# Patient Record
Sex: FEMALE | Race: WHITE | NOT HISPANIC OR LATINO | Employment: FULL TIME | URBAN - METROPOLITAN AREA
[De-identification: names, ages, dates, MRNs, and addresses within clinical notes are randomized per-mention and may not be internally consistent; named-entity substitution may affect disease eponyms.]

---

## 2023-04-28 ENCOUNTER — APPOINTMENT (OUTPATIENT)
Dept: PRIMARY CARE | Facility: CLINIC | Age: 53
End: 2023-04-28
Payer: COMMERCIAL

## 2023-05-08 ENCOUNTER — APPOINTMENT (OUTPATIENT)
Dept: PRIMARY CARE | Facility: CLINIC | Age: 53
End: 2023-05-08
Payer: COMMERCIAL

## 2023-06-12 ENCOUNTER — OFFICE VISIT (OUTPATIENT)
Dept: PRIMARY CARE | Facility: CLINIC | Age: 53
End: 2023-06-12
Payer: COMMERCIAL

## 2023-06-12 VITALS
HEART RATE: 77 BPM | SYSTOLIC BLOOD PRESSURE: 132 MMHG | WEIGHT: 293 LBS | TEMPERATURE: 97.3 F | BODY MASS INDEX: 48.03 KG/M2 | DIASTOLIC BLOOD PRESSURE: 72 MMHG

## 2023-06-12 DIAGNOSIS — Z12.4 CERVICAL CANCER SCREENING: ICD-10-CM

## 2023-06-12 DIAGNOSIS — E66.01 MORBID OBESITY WITH BMI OF 45.0-49.9, ADULT (MULTI): ICD-10-CM

## 2023-06-12 DIAGNOSIS — N93.9 ABNORMAL VAGINAL BLEEDING: ICD-10-CM

## 2023-06-12 DIAGNOSIS — Z00.00 VISIT FOR PREVENTIVE HEALTH EXAMINATION: Primary | ICD-10-CM

## 2023-06-12 DIAGNOSIS — Z12.31 ENCOUNTER FOR SCREENING MAMMOGRAM FOR BREAST CANCER: ICD-10-CM

## 2023-06-12 DIAGNOSIS — R25.2 LEG CRAMPS: ICD-10-CM

## 2023-06-12 PROCEDURE — 3008F BODY MASS INDEX DOCD: CPT | Performed by: FAMILY MEDICINE

## 2023-06-12 PROCEDURE — 99213 OFFICE O/P EST LOW 20 MIN: CPT | Performed by: FAMILY MEDICINE

## 2023-06-12 PROCEDURE — 3078F DIAST BP <80 MM HG: CPT | Performed by: FAMILY MEDICINE

## 2023-06-12 PROCEDURE — 3075F SYST BP GE 130 - 139MM HG: CPT | Performed by: FAMILY MEDICINE

## 2023-06-12 PROCEDURE — 99396 PREV VISIT EST AGE 40-64: CPT | Performed by: FAMILY MEDICINE

## 2023-06-12 PROCEDURE — 87624 HPV HI-RISK TYP POOLED RSLT: CPT

## 2023-06-12 PROCEDURE — 88175 CYTOPATH C/V AUTO FLUID REDO: CPT

## 2023-06-12 PROCEDURE — 1036F TOBACCO NON-USER: CPT | Performed by: FAMILY MEDICINE

## 2023-06-12 RX ORDER — ATORVASTATIN CALCIUM 20 MG/1
20 TABLET, FILM COATED ORAL DAILY
COMMUNITY
Start: 2022-02-21 | End: 2024-01-12

## 2023-06-12 RX ORDER — METOPROLOL SUCCINATE 25 MG/1
TABLET, EXTENDED RELEASE ORAL
COMMUNITY
Start: 2022-04-06 | End: 2023-10-14

## 2023-06-12 RX ORDER — LISINOPRIL 5 MG/1
5 TABLET ORAL DAILY
COMMUNITY
Start: 2022-04-06 | End: 2023-12-05 | Stop reason: WASHOUT

## 2023-06-12 RX ORDER — METHOCARBAMOL 500 MG/1
500 TABLET, FILM COATED ORAL 2 TIMES DAILY
Qty: 20 TABLET | Refills: 2 | Status: SHIPPED | OUTPATIENT
Start: 2023-06-12 | End: 2024-03-25

## 2023-06-12 ASSESSMENT — PATIENT HEALTH QUESTIONNAIRE - PHQ9
1. LITTLE INTEREST OR PLEASURE IN DOING THINGS: NOT AT ALL
SUM OF ALL RESPONSES TO PHQ9 QUESTIONS 1 AND 2: 0
2. FEELING DOWN, DEPRESSED OR HOPELESS: NOT AT ALL

## 2023-06-12 NOTE — PROGRESS NOTES
Subjective   Patient ID: Ana Martel is a 53 y.o. female who presents for Annual Exam (Patient would like a pap.having crams on the calf since Friday. Going throw menopause and having heavy periods when it comes.).  Very pleasant 53-year-old G1, P1 postmenopausal here today for annual wellness exam  No recent hospital ER visits or surgery did have COVID and recovered  No new family history  Went for a hike some weeks ago and has been having significant leg cramps ever since no shortness of breath did have history of PE in the past but no underlying clotting issues  Also has been having some vaginal bleeding postmenopausal which has been concerning intermittent and increasing    Muscle Pain  This is a new problem. The current episode started 1 to 4 weeks ago. The problem occurs daily. The problem has been gradually worsening since onset. The context of the pain is unknown. The pain is present in the left lower leg and right lower leg. The pain is mild. The symptoms are aggravated by any movement. Pertinent negatives include no abdominal pain, chest pain, constipation, diarrhea, dysuria, eye pain, fatigue, fever, headaches, joint swelling, nausea, rash, stiffness, sensory change, shortness of breath, swollen glands, urinary symptoms, vaginal discharge, visual change, vomiting, weakness or wheezing. Past treatments include nothing. The treatment provided no relief. There is no swelling present. She has been Behaving normally. There is no history of chronic back pain, rheumatic disease or sickle cell disease.   Vaginal Bleeding  The patient's primary symptoms include vaginal bleeding. The patient's pertinent negatives include no genital itching, genital lesions, genital odor, genital rash, missed menses, pelvic pain or vaginal discharge. This is a recurrent problem. The current episode started more than 1 month ago. The problem occurs intermittently. The problem has been gradually worsening. The patient is  experiencing no pain. The problem affects both sides. She is not pregnant. Pertinent negatives include no abdominal pain, anorexia, back pain, chills, constipation, diarrhea, discolored urine, dysuria, fever, flank pain, frequency, headaches, hematuria, joint pain, joint swelling, nausea, painful intercourse, rash, sore throat, urgency or vomiting. The vaginal discharge was bloody. The vaginal bleeding is heavier than menses. She has not been passing clots. She has not been passing tissue. Nothing aggravates the symptoms. She has tried nothing for the symptoms. The treatment provided no relief. She is sexually active. No, her partner does not have an STD. She uses nothing for contraception. She is postmenopausal. There is no history of an abdominal surgery, a  section, an ectopic pregnancy, endometriosis, a gynecological surgery, herpes simplex, menorrhagia, metrorrhagia, miscarriage, ovarian cysts, perineal abscess, PID, an STD, a terminated pregnancy or vaginosis.       Review of Systems   Constitutional:  Negative for chills, fatigue and fever.   HENT:  Negative for sore throat.    Eyes:  Negative for pain.   Respiratory:  Negative for shortness of breath and wheezing.    Cardiovascular:  Negative for chest pain.   Gastrointestinal:  Negative for abdominal pain, anorexia, constipation, diarrhea, nausea and vomiting.   Genitourinary:  Positive for vaginal bleeding. Negative for dysuria, flank pain, frequency, hematuria, menorrhagia, missed menses, pelvic pain, urgency and vaginal discharge.   Musculoskeletal:  Negative for back pain, joint pain, joint swelling and stiffness.   Skin:  Negative for rash.   Neurological:  Negative for sensory change, weakness and headaches.     Constitutional: no chills, no fever and no night sweats.   Eyes: no blurred vision and no eyesight problems.   ENT: no hearing loss, no nasal congestion, no nasal discharge, no hoarseness and no sore throat.   Cardiovascular: no chest  pain, no intermittent leg claudication, no lower extremity edema, no palpitations and no syncope.   Respiratory: no cough, no shortness of breath during exertion, no shortness of breath at rest and no wheezing.   Gastrointestinal: no abdominal pain, no blood in stools, no constipation, no diarrhea, no melena, no nausea, no rectal pain and no vomiting.   Genitourinary: no dysuria, no change in urinary frequency, no urinary hesitancy, no feelings of urinary urgency and no vaginal discharge.   Musculoskeletal: no arthralgias,  no back pain and no myalgias.   Integumentary: no new skin lesions and no rashes.   Neurological: no difficulty walking, no headache, no limb weakness, no numbness and no tingling.   Psychiatric: no anxiety, no depression, no anhedonia and no substance use disorders.   Endocrine: no recent weight gain and no recent weight loss.   Hematologic/Lymphatic: no tendency for easy bruising and no swollen glands .    Objective    /72   Pulse 77   Temp 36.3 °C (97.3 °F)   Wt 150 kg (330 lb)   BMI 48.03 kg/m²    Physical Exam  The patient appeared well nourished and normally developed. Vital signs as documented. Head exam is unremarkable. No scleral icterus or corneal arcus noted.  Pupils are equal round reactive to light extraocular movements are intact no hemorrhages noted on funduscopic exam mouth mucous membranes are moist no exudates ears canals clear TMs are gray pearly not injected nose no rhinorrhea or epistaxis Neck is without jugular venous distension, thyromegaly, or carotid bruits. Carotid upstrokes are brisk bilaterally. Lungs are clear to auscultation and percussion. Cardiac exam reveals the PMI to be normally sized and situated. Rhythm is regular. First and second heart sounds normal. No murmurs, rubs or gallops. Abdominal exam reveals normal bowel sounds, no masses, no organomegaly and no aortic enlargement. Extremities are nonedematous and both femoral and pedal pulses are normal.   Neurologic exam DTRs are equal bilaterally no focal deficits strength is symmetrical heme lymph no palpable lymph nodes in the neck axilla or groin breast no masses discharge axillary masses or asymmetry external genitalia unremarkable no adnexal tenderness or masses no cervicitis or cervical motion tenderness or uterine enlargement    Assessment/Plan   Problem List Items Addressed This Visit       Visit for preventive health examination - Primary     Continue annual exams         Relevant Orders    Comprehensive Metabolic Panel    Lipid Panel    Hemoglobin A1C    Leg cramps     Try muscle relaxer  Follow-up if symptoms do not improve  I am checking blood work today to see if there are any electrolyte abnormalities that could explain your cramping         Abnormal vaginal bleeding     Pelvic ultrasound to evaluate the abnormal bleeding  Need to rule out a neoplastic process         Relevant Orders    US pelvis    Morbid obesity with BMI of 45.0-49.9, adult (CMS/HCC)     Above normal BMI  Nutrition and physical activity reviewed          Other Visit Diagnoses       Encounter for screening mammogram for breast cancer        Relevant Orders    BI mammo bilateral screening tomosynthesis    Cervical cancer screening        Relevant Orders    THINPREP PAP TEST (Completed)                 Sully Hill MD

## 2023-06-21 LAB
COMPLETE PATHOLOGY REPORT: NORMAL
CONVERTED CLINICAL DIAGNOSIS-HISTORY: NORMAL
CONVERTED DIAGNOSIS COMMENT: NORMAL
CONVERTED FINAL DIAGNOSIS: NORMAL
CONVERTED FINAL REPORT PDF LINK TO COPY AND PASTE: NORMAL

## 2023-06-25 PROBLEM — J30.2 SEASONAL ALLERGIES: Status: ACTIVE | Noted: 2023-06-25

## 2023-06-25 PROBLEM — I10 HYPERTENSION: Status: ACTIVE | Noted: 2023-06-25

## 2023-06-25 PROBLEM — E78.5 HYPERLIPIDEMIA: Status: ACTIVE | Noted: 2023-06-25

## 2023-06-25 PROBLEM — H04.123 DRY EYES: Status: RESOLVED | Noted: 2023-06-25 | Resolved: 2023-06-25

## 2023-06-25 PROBLEM — N20.0 RENAL LITHIASIS: Status: RESOLVED | Noted: 2023-06-25 | Resolved: 2023-06-25

## 2023-06-25 PROBLEM — K62.5 BRIGHT RED BLOOD PER RECTUM: Status: RESOLVED | Noted: 2023-06-25 | Resolved: 2023-06-25

## 2023-06-25 PROBLEM — E66.01 MORBID OBESITY WITH BMI OF 45.0-49.9, ADULT (MULTI): Status: ACTIVE | Noted: 2023-06-25

## 2023-06-25 PROBLEM — Z00.00 VISIT FOR PREVENTIVE HEALTH EXAMINATION: Status: ACTIVE | Noted: 2023-06-25

## 2023-06-25 PROBLEM — N92.0 HEAVY MENSES: Status: RESOLVED | Noted: 2023-06-25 | Resolved: 2023-06-25

## 2023-06-25 PROBLEM — E03.9 HYPOTHYROIDISM: Status: ACTIVE | Noted: 2023-06-25

## 2023-06-25 PROBLEM — N64.89 BREAST ASYMMETRY: Status: ACTIVE | Noted: 2023-06-25

## 2023-06-25 PROBLEM — U07.1 COVID-19: Status: RESOLVED | Noted: 2023-06-25 | Resolved: 2023-06-25

## 2023-06-25 PROBLEM — R25.2 LEG CRAMPS: Status: ACTIVE | Noted: 2023-06-25

## 2023-06-25 PROBLEM — I26.99 PULMONARY EMBOLISM (MULTI): Status: ACTIVE | Noted: 2023-06-25

## 2023-06-25 PROBLEM — N93.9 ABNORMAL VAGINAL BLEEDING: Status: ACTIVE | Noted: 2023-06-25

## 2023-06-25 ASSESSMENT — ENCOUNTER SYMPTOMS
BACK PAIN: 0
FREQUENCY: 0
SORE THROAT: 0
ABDOMINAL PAIN: 0
WEAKNESS: 0
HEMATURIA: 0
CONSTIPATION: 0
VOMITING: 0
FATIGUE: 0
CHILLS: 0
FLANK PAIN: 0
STIFFNESS: 0
HEADACHES: 0
JOINT SWELLING: 0
SWOLLEN GLANDS: 0
FEVER: 0
NAUSEA: 0
EYE PAIN: 0
SHORTNESS OF BREATH: 0
ANOREXIA: 0
DYSURIA: 0
MUSCULOSKELETAL PAIN: 1
WHEEZING: 0
VISUAL CHANGE: 0
SENSORY CHANGE: 0
DIARRHEA: 0

## 2023-06-26 NOTE — PATIENT INSTRUCTIONS
Today had your annual wellness exam  Blood work for biometric screening  You have been having some abnormal uterine bleeding I am ordering a pelvic ultrasound to evaluate  Leg cramps your examination is normal but I am prescribing a muscle relaxer I think this is most likely muscle strain  I will check electrolytes to make sure there is not something else causing  Remember healthy diet and exercise  Continue annual exams

## 2023-06-26 NOTE — ASSESSMENT & PLAN NOTE
Try muscle relaxer  Follow-up if symptoms do not improve  I am checking blood work today to see if there are any electrolyte abnormalities that could explain your cramping

## 2023-07-01 ENCOUNTER — APPOINTMENT (OUTPATIENT)
Dept: LAB | Facility: LAB | Age: 53
End: 2023-07-01
Payer: COMMERCIAL

## 2023-07-01 LAB
ALANINE AMINOTRANSFERASE (SGPT) (U/L) IN SER/PLAS: 33 U/L (ref 7–45)
ALBUMIN (G/DL) IN SER/PLAS: 4.2 G/DL (ref 3.4–5)
ALKALINE PHOSPHATASE (U/L) IN SER/PLAS: 55 U/L (ref 33–110)
ANION GAP IN SER/PLAS: 13 MMOL/L (ref 10–20)
ASPARTATE AMINOTRANSFERASE (SGOT) (U/L) IN SER/PLAS: 37 U/L (ref 9–39)
BILIRUBIN TOTAL (MG/DL) IN SER/PLAS: 0.5 MG/DL (ref 0–1.2)
CALCIUM (MG/DL) IN SER/PLAS: 9.9 MG/DL (ref 8.6–10.6)
CARBON DIOXIDE, TOTAL (MMOL/L) IN SER/PLAS: 30 MMOL/L (ref 21–32)
CHLORIDE (MMOL/L) IN SER/PLAS: 102 MMOL/L (ref 98–107)
CHOLESTEROL (MG/DL) IN SER/PLAS: 141 MG/DL (ref 0–199)
CHOLESTEROL IN HDL (MG/DL) IN SER/PLAS: 38.1 MG/DL
CHOLESTEROL/HDL RATIO: 3.7
CREATININE (MG/DL) IN SER/PLAS: 0.55 MG/DL (ref 0.5–1.05)
ESTIMATED AVERAGE GLUCOSE FOR HBA1C: 249 MG/DL
GFR FEMALE: >90 ML/MIN/1.73M2
GLUCOSE (MG/DL) IN SER/PLAS: 208 MG/DL (ref 74–99)
HEMOGLOBIN A1C/HEMOGLOBIN TOTAL IN BLOOD: 10.3 %
LDL: 71 MG/DL (ref 0–99)
POTASSIUM (MMOL/L) IN SER/PLAS: 4.5 MMOL/L (ref 3.5–5.3)
PROTEIN TOTAL: 7.4 G/DL (ref 6.4–8.2)
SODIUM (MMOL/L) IN SER/PLAS: 140 MMOL/L (ref 136–145)
TRIGLYCERIDE (MG/DL) IN SER/PLAS: 162 MG/DL (ref 0–149)
UREA NITROGEN (MG/DL) IN SER/PLAS: 14 MG/DL (ref 6–23)
VLDL: 32 MG/DL (ref 0–40)

## 2023-09-20 ENCOUNTER — OFFICE VISIT (OUTPATIENT)
Dept: PRIMARY CARE | Facility: CLINIC | Age: 53
End: 2023-09-20
Payer: COMMERCIAL

## 2023-09-20 VITALS
SYSTOLIC BLOOD PRESSURE: 133 MMHG | HEIGHT: 70 IN | HEART RATE: 82 BPM | WEIGHT: 293 LBS | DIASTOLIC BLOOD PRESSURE: 81 MMHG | BODY MASS INDEX: 41.95 KG/M2 | TEMPERATURE: 96.9 F

## 2023-09-20 DIAGNOSIS — B00.1 COLD SORE: ICD-10-CM

## 2023-09-20 DIAGNOSIS — E11.9 TYPE 2 DIABETES MELLITUS WITHOUT COMPLICATION, UNSPECIFIED WHETHER LONG TERM INSULIN USE (MULTI): Primary | ICD-10-CM

## 2023-09-20 DIAGNOSIS — E66.01 MORBID OBESITY WITH BMI OF 45.0-49.9, ADULT (MULTI): ICD-10-CM

## 2023-09-20 PROCEDURE — 3079F DIAST BP 80-89 MM HG: CPT | Performed by: FAMILY MEDICINE

## 2023-09-20 PROCEDURE — 4010F ACE/ARB THERAPY RXD/TAKEN: CPT | Performed by: FAMILY MEDICINE

## 2023-09-20 PROCEDURE — 3046F HEMOGLOBIN A1C LEVEL >9.0%: CPT | Performed by: FAMILY MEDICINE

## 2023-09-20 PROCEDURE — 3075F SYST BP GE 130 - 139MM HG: CPT | Performed by: FAMILY MEDICINE

## 2023-09-20 PROCEDURE — 3008F BODY MASS INDEX DOCD: CPT | Performed by: FAMILY MEDICINE

## 2023-09-20 PROCEDURE — 99213 OFFICE O/P EST LOW 20 MIN: CPT | Performed by: FAMILY MEDICINE

## 2023-09-20 PROCEDURE — 1036F TOBACCO NON-USER: CPT | Performed by: FAMILY MEDICINE

## 2023-09-20 RX ORDER — VALACYCLOVIR HYDROCHLORIDE 1 G/1
1000 TABLET, FILM COATED ORAL 3 TIMES DAILY
Qty: 21 TABLET | Refills: 0 | Status: SHIPPED | OUTPATIENT
Start: 2023-09-20 | End: 2023-09-27

## 2023-09-20 RX ORDER — TIRZEPATIDE 5 MG/.5ML
5 INJECTION, SOLUTION SUBCUTANEOUS
Qty: 2 ML | Refills: 3 | Status: SHIPPED | OUTPATIENT
Start: 2023-09-20 | End: 2024-03-25 | Stop reason: WASHOUT

## 2023-09-20 RX ORDER — PENCICLOVIR 10 MG/G
CREAM TOPICAL
Qty: 5 G | Refills: 0 | Status: SHIPPED | OUTPATIENT
Start: 2023-09-20 | End: 2023-12-05 | Stop reason: WASHOUT

## 2023-09-20 RX ORDER — METFORMIN HYDROCHLORIDE 1000 MG/1
1000 TABLET ORAL
Qty: 180 TABLET | Refills: 3 | Status: SHIPPED | OUTPATIENT
Start: 2023-09-20 | End: 2024-09-19

## 2023-09-20 ASSESSMENT — PATIENT HEALTH QUESTIONNAIRE - PHQ9
2. FEELING DOWN, DEPRESSED OR HOPELESS: NOT AT ALL
SUM OF ALL RESPONSES TO PHQ9 QUESTIONS 1 AND 2: 0
1. LITTLE INTEREST OR PLEASURE IN DOING THINGS: NOT AT ALL

## 2023-09-20 NOTE — PROGRESS NOTES
Subjective   Patient ID: Ana Martel is a 53 y.o. female who presents for Follow-up (On blood work results for diabetic.).  Very pleasant 53-year-old with family history of diabetes  On screening blood test her A1c was found to be elevated at 10  Patient had prediabetes has struggled with obesity  She has not had any recent hospital or emergency room visits  No chest pain or shortness of breath she is here today for follow-up she has been completely asymptomatic feels a little tired sometimes she is a little thirsty but otherwise has been feeling well has not had any unexplained weight loss  She is here today to discuss the results of her blood work and to initiate treatment she would like to try medical management first        Review of Systems  Constitutional: no chills, no fever and no night sweats.   Eyes: no blurred vision and no eyesight problems.   ENT: no hearing loss, no nasal congestion, no nasal discharge, no hoarseness and no sore throat.   Cardiovascular: no chest pain, no intermittent leg claudication, no lower extremity edema, no palpitations and no syncope.   Respiratory: no cough, no shortness of breath during exertion, no shortness of breath at rest and no wheezing.   Gastrointestinal: no abdominal pain, no blood in stools, no constipation, no diarrhea, no melena, no nausea, no rectal pain and no vomiting.   Genitourinary: no dysuria, no change in urinary frequency, no urinary hesitancy, no feelings of urinary urgency and no vaginal discharge.   Musculoskeletal: no arthralgias,  no back pain and no myalgias.   Integumentary: no new skin lesions and no rashes.   Neurological: no difficulty walking, no headache, no limb weakness, no numbness and no tingling.   Psychiatric: no anxiety, no depression, no anhedonia and no substance use disorders.   Endocrine: no recent weight gain and no recent weight loss.   Hematologic/Lymphatic: no tendency for easy bruising and no swollen glands .    Objective   "  /81   Pulse 82   Temp 36.1 °C (96.9 °F)   Ht 1.778 m (5' 10\")   Wt 145 kg (320 lb)   BMI 45.92 kg/m²    Physical Exam  The patient appeared well nourished and normally developed. Vital signs as documented. Head exam is unremarkable. No scleral icterus or corneal arcus noted.  Pupils are equal round reactive to light extraocular movements are intact no hemorrhages noted on funduscopic exam mouth mucous membranes are moist no exudates ears canals clear TMs are gray pearly not injected nose no rhinorrhea or epistaxis Neck is without jugular venous distension, thyromegaly, or carotid bruits. Carotid upstrokes are brisk bilaterally. Lungs are clear to auscultation and percussion. Cardiac exam reveals the PMI to be normally sized and situated. Rhythm is regular. First and second heart sounds normal. No murmurs, rubs or gallops. Abdominal exam reveals normal bowel sounds, no masses, no organomegaly and no aortic enlargement. Extremities are nonedematous and both femoral and pedal pulses are normal.  Neurologic exam DTRs are equal bilaterally no focal deficits strength is symmetrical heme lymph no palpable lymph nodes in the neck axilla or groin  Barefoot exam good pulses good light touch good capillary refill no ulcers  Assessment/Plan   Problem List Items Addressed This Visit       Morbid obesity with BMI of 45.0-49.9, adult (CMS/[facility])     Above normal BMI nutrition and physical activity reviewed         Type 2 diabetes mellitus without complication (CMS/[facility]) - Primary     Diabetes education  Begin treatment with Mounjaro and Glucophage  Recheck blood work in 3 months           Relevant Medications    tirzepatide (Mounjaro) 5 mg/0.5 mL pen injector    metFORMIN (Glucophage) 1,000 mg tablet    Cold sore    Relevant Medications    penciclovir (Denavir) 1 % cream        New onset diabetes  Begin Mounjaro  Begin Glucophage  Diabetes education class  Follow-up in 3 months  Above normal BMI nutrition and physical " activity reviewed  Cold sore apply topical Denavir    Sully Hill MD

## 2023-09-27 ENCOUNTER — TELEPHONE (OUTPATIENT)
Dept: PRIMARY CARE | Facility: CLINIC | Age: 53
End: 2023-09-27
Payer: COMMERCIAL

## 2023-09-27 NOTE — TELEPHONE ENCOUNTER
Blank form for the doctor to fill out was faxed 9/27/23 by [facility] pharmacies.

## 2023-09-30 PROBLEM — B00.1 COLD SORE: Status: ACTIVE | Noted: 2023-09-30

## 2023-09-30 PROBLEM — E11.9 TYPE 2 DIABETES MELLITUS WITHOUT COMPLICATION (MULTI): Status: ACTIVE | Noted: 2023-09-30

## 2023-09-30 NOTE — ASSESSMENT & PLAN NOTE
Diabetes education  Begin treatment with Mounjaro and Glucophage  Recheck blood work in 3 months

## 2023-10-04 ENCOUNTER — TELEPHONE (OUTPATIENT)
Dept: PRIMARY CARE | Facility: CLINIC | Age: 53
End: 2023-10-04
Payer: COMMERCIAL

## 2023-10-04 NOTE — TELEPHONE ENCOUNTER
Patient received two free samples of Dexcom from Dr. Hill until her insurance cleared and one patch fell off and will not stick.  She was wondering if Dr. Hill had any more samples.

## 2023-10-06 ENCOUNTER — TELEPHONE (OUTPATIENT)
Dept: PRIMARY CARE | Facility: CLINIC | Age: 53
End: 2023-10-06
Payer: COMMERCIAL

## 2023-10-06 NOTE — TELEPHONE ENCOUNTER
Pharmacy is calling to get the status of a prior authorization for dexcon their phone number is [phone].

## 2023-10-09 DIAGNOSIS — I10 PRIMARY HYPERTENSION: Primary | ICD-10-CM

## 2023-10-14 RX ORDER — METOPROLOL SUCCINATE 25 MG/1
25 TABLET, EXTENDED RELEASE ORAL DAILY
Qty: 90 TABLET | Refills: 3 | Status: SHIPPED | OUTPATIENT
Start: 2023-10-14

## 2023-10-19 DIAGNOSIS — E11.9 TYPE 2 DIABETES MELLITUS WITHOUT COMPLICATION, WITHOUT LONG-TERM CURRENT USE OF INSULIN (MULTI): Primary | ICD-10-CM

## 2023-10-23 DIAGNOSIS — I10 HYPERTENSION, UNSPECIFIED TYPE: ICD-10-CM

## 2023-10-23 RX ORDER — TRIAMTERENE/HYDROCHLOROTHIAZID 37.5-25 MG
TABLET ORAL
Qty: 90 TABLET | Refills: 3 | Status: SHIPPED | OUTPATIENT
Start: 2023-10-23

## 2023-10-23 RX ORDER — BLOOD-GLUCOSE SENSOR
1 EACH MISCELLANEOUS DAILY
Qty: 10 EACH | Refills: 11 | Status: SHIPPED | OUTPATIENT
Start: 2023-10-23

## 2023-10-31 RX ORDER — HYDROCODONE BITARTRATE AND HOMATROPINE METHYLBROMIDE ORAL SOLUTION 5; 1.5 MG/5ML; MG/5ML
LIQUID ORAL
COMMUNITY
End: 2024-03-25 | Stop reason: ALTCHOICE

## 2023-10-31 RX ORDER — BENZONATATE 200 MG/1
1 CAPSULE ORAL 3 TIMES DAILY PRN
COMMUNITY
End: 2023-12-05 | Stop reason: WASHOUT

## 2023-10-31 RX ORDER — VALSARTAN 160 MG/1
160 TABLET ORAL DAILY
COMMUNITY
Start: 2023-10-18 | End: 2023-12-14

## 2023-10-31 RX ORDER — ALBUTEROL SULFATE 90 UG/1
AEROSOL, METERED RESPIRATORY (INHALATION)
COMMUNITY
End: 2023-12-05 | Stop reason: WASHOUT

## 2023-10-31 RX ORDER — BLOOD-GLUCOSE,RECEIVER,CONT
EACH MISCELLANEOUS
COMMUNITY
Start: 2023-10-20 | End: 2024-05-22 | Stop reason: WASHOUT

## 2023-11-01 ENCOUNTER — NUTRITION (OUTPATIENT)
Dept: NUTRITION | Facility: CLINIC | Age: 53
End: 2023-11-01
Payer: COMMERCIAL

## 2023-11-01 DIAGNOSIS — E11.9 TYPE 2 DIABETES MELLITUS WITHOUT COMPLICATION, UNSPECIFIED WHETHER LONG TERM INSULIN USE (MULTI): ICD-10-CM

## 2023-11-01 PROCEDURE — 98960 EDU&TRN PT SELF-MGMT NQHP 1: CPT | Performed by: INTERNAL MEDICINE

## 2023-11-01 NOTE — PROGRESS NOTES
Reason for Visit:  Ana Martel is a 53 y.o. female who presents for Initial DSME Visit    DSME - Global Assessment    Marital Status: .  Support Person:   .    What do you hope to gain from this diabetes education visit? A little more knowledge about DM. Since wearing Dexcom is helping me see my numbrers, I'm losing wt eating less processed foods.     Have you had diabetes education in the past?  No.  In Your words, what is Diabetes: Too much sugar and glucose in your body. More work for your pancreas  What Concerns you most about having diabetes: Don't want to need to use insulin.     Readiness to Learn: demonstrates willingness to learn and demonstrates ability to learn  Preferred learning method: reading    Household Composition: living independently, with family    Demographics:   Difficulties with: None  Highest Level of Education: High School  Race/Ethnic Origin: White/  Occupation:    Work hours: 8-5    Health Status:  Smoking/Tobacco Use: No, patient does not smoke or use tobacco.  Alcohol Use: Amount: Once a month one 1-2 drinks.    Type of Diabetes: Type 2  What year were you diagnosed with diabetes: Recent  Family History: Mother  Patient Active Problem List    Diagnosis Date Noted    Type 2 diabetes mellitus without complication (CMS/[facility]) 09/30/2023    Cold sore 09/30/2023    Seasonal allergies 06/25/2023    Pulmonary embolism (CMS/[facility]) 06/25/2023    Hypothyroidism 06/25/2023    Hypertension 06/25/2023    Hyperlipidemia 06/25/2023    Breast asymmetry 06/25/2023    Visit for preventive health examination 06/25/2023    Leg cramps 06/25/2023    Abnormal vaginal bleeding 06/25/2023    Morbid obesity with BMI of 45.0-49.9, adult (CMS/[facility]) 06/25/2023            Lab Results   Component Value Date    HGBA1C 10.3 (A) 07/01/2023    HGBA1C 6.4 09/30/2020       Health Utilization Past 12 Months:   Hospital Admissions: No.  ER Visits: No.  Primary Care Visits: Yes.  Last  Eye Exam : within one year  Podiatry : [facility]  Dentist : once per year    Diabetes Self-Management Skills and Behaviors:   Do you exercise regularly?: Yes. Once per week  minutes per day  Physical Activity : walking  No. Average amount of hours slept per night: 5-6  How do you manage your diabetes when you are sick?: unsure    Diabetes Medications: oral agents and non-insulin injectables  Current Outpatient Medications   Medication Sig Dispense Refill    albuterol 90 mcg/actuation inhaler INHALE 2 PUFFS EVERY 4-6 HOURS BY INHALATION ROUTE AS NEEDED.      atorvastatin (Lipitor) 20 mg tablet Take 1 tablet (20 mg) by mouth once daily.      azithromycin (Zithromax) 250 mg tablet TAKE 2 TABLETS BY MOUTH TODAY, THEN TAKE 1 TABLET DAILY FOR 4 DAYS 6 tablet 0    benzonatate (Tessalon) 200 mg capsule Take 1 capsule (200 mg) by mouth 3 times a day as needed.      blood-glucose sensor (Dexcom G7 Sensor) device Inject 1 each under the skin early in the morning.. 10 each 11    Dexcom G7  misc       hydrocodone-homatropine 5-1.5 mg/5 mL syrup TAKE 5 ML BY MOUTH AT BEDTIME      lisinopril 5 mg tablet Take 1 tablet (5 mg) by mouth once daily.      metFORMIN (Glucophage) 1,000 mg tablet Take 1 tablet (1,000 mg) by mouth 2 times a day with meals. 180 tablet 3    methocarbamol (Robaxin) 500 mg tablet Take 1 tablet (500 mg) by mouth 2 times a day for 10 days. 20 tablet 2    metoprolol succinate XL (Toprol-XL) 25 mg 24 hr tablet Take 1 tablet (25 mg) by mouth once daily. 90 tablet 3    penciclovir (Denavir) 1 % cream Apply topically every 2 hours. 5 g 0    tirzepatide (Mounjaro) 5 mg/0.5 mL pen injector Inject 5 mg under the skin 1 (one) time per week. 2 mL 3    triamterene (DYRENIUM ORAL)       triamterene-hydrochlorothiazid (Maxzide-25) 37.5-25 mg tablet TAKE 1 TABLET DAILY 90 tablet 3    valsartan (Diovan) 160 mg tablet Take 1 tablet (160 mg) by mouth once daily.       No current facility-administered medications for this visit.      Meds  Only taking Metformin once per day because I'm fasting till noon. If I take it at noon and 5 pm my stools are loose and stomach doesn't feel good.       Injection/Infusion Sites: abdominal wall.  No issues    Monitorng: CGM: Dexcom G7    Acute Complications-Safety: None    Hypoglycemia: None  Hypoglycemia Treatment: N/A    Hyperglycemia: None. Had symptoms prior to diagnosis, not now  Hyperglycemia Treatment: None    Reproductive/Currently Pregnant : No.  Do you use birth control? : No  Are you planning to become pregnant in the future? : No  Have you reached Menopause? : Yes    Diabetes Assessment:   DM Interferes with other aspects of my life: neutral.  My level of stress is high: neutral.  I struggle with making changes in my life: disagree.  How do you handle stress: Take a break  Most difficult part of managing DM: Eating. I think I'm a little stressed about food. What is gonna set my numbers high    DSME - Meal Planning and Diet Recall  Are you currently following any meal plan:  Has researched some on how to eat . Does use i2O Water lee to help track carbs.     Does your culture or Latter day require any of the following:  No    How often do you eat out? Once per week (last month prior to diagnosis 3-4 times per week)  How many meals do you eat in per day: three.  Which meals do you tend to skip: breakfast  What do you drink with and between meals: water, green tea.    Diet Recall:   I'm fasting because I normally don't eat in morning anyway. Usually fasting for 17 hours. Eats about 3 times in 7 hour period. Been doing this for about 4-5 months. Has done a lot of food research.    DSME - Goals and Recommendations    [facility] Diabetes Education Program SMART Behavior Goal Setting:        S - Specific: Exactly what do you want to do        M - Measurable: Use a calendar or chart to track progress        A - Attainable: Take small steps to make bigger changes        R - Realistic: Pick  something reasonable that you know you can do        T - Time Oriented: Choose a time limit (No longer than 6 months)    Specific Goal:   It was challenging to get pt to set a measurable goal.  She did want to focus it on diet.  Goal: I will eat about 2 cups non-starchy vegetables between planned lunch and snack afterwards.    Measurable: How will I track my goal:  I will keep track of my progress daily by  Other .   Time Oriented: four weeks.     Topics Covered and Impression:    DSME Topics Covered During Visit:   Understanding Diabetes Basics  Reviewing Medication Classes  Taking Medications as Prescribed  Being Physically Active  Healthy Meal Plan  MyPlate Method    Reviewed Diabetes Technology: Patient accepted Dexcom clarity software invitation.     DSME Topics for Follow-Up:   Reviewed Chronic Complications/Risks Related to Diabetes  Taking Medications as Prescribed  Being Physically Active  Review Glycemic Goals (CGM or SMBG)  Reviewed Hypoglycemia Signs/Symptoms/Tx Plan  Reviewed Hyperglycemia Signs/Symptoms/Tx Plan  Glycemic Pattern Management  Healthy Meal Plan  Sick Day Rules  Understanding CGM data    Materials provided during today's visit:  DM patient guide, 2 handouts on exercise, blood glucose log sheet, A1C handout, hypo/hyperglycemia signs/symptoms & treatment for hypoglycemia. Non-starchy vegetables list.       Provider Impression: Patient is not taking Metformin 1000 mg twice daily (missing morning dose) due to 17 hour fasting. States if she takes it at first meal and last meal (5 hours apart) then she gets stomach upset & loose stools. Suggested she try taking both pills at dinner and see if she notices any difference in her blood sugars. She feels fasting is helpful and wants to continue. She wanted to focus visit mostly on diet. Discussed how to boost fiber, healthy fat and protein at meals, adding resistant starch and if possible eating carbs towards end of meal all in effort to keep blood  "sugars more stable. Began education on basic CHO counting, using exchange lists, reading labels, limiting unhealthy & including healthy fats & protein sources. Reviewed some meals she eats and some healthier options provided. She feels CGM has been helpful to show her how certain foods impact her. \"After I ate something it made my blood sugar jump into 300's.\" She has made changes to reduce those foods and portions. Reports having lost a few pounds. Role and benefit of regular physical activity discussed including aerobic and resistance exercises. Although she recently started wearing Dexcom G7 and did accept this writer's invitation to view data on Dexcom software yesterday we did not have time to review the data. Plan to do this at next visit and cover additional DSMES topics at next visit. The referring provider is within the same EMR and is able to see the DSMES provided and the outcomes.       Time: I personally spent a total of 60 minutes with the patient providing diabetes self-management education. Visit documentation will be sent electronically to referring provider.          "

## 2023-11-13 ENCOUNTER — TELEPHONE (OUTPATIENT)
Dept: PRIMARY CARE | Facility: CLINIC | Age: 53
End: 2023-11-13
Payer: COMMERCIAL

## 2023-11-13 NOTE — TELEPHONE ENCOUNTER
Patient would like to get blood order put in for her December physical so she can get it down before the appointment. She will go down the eisenberg at [facility].  She also got an order for education for diabetes management and is having trouble finding someone to help her.  She would like to get the order changed to nutrition  management.

## 2023-11-14 DIAGNOSIS — E11.9 TYPE 2 DIABETES MELLITUS WITHOUT COMPLICATION, WITHOUT LONG-TERM CURRENT USE OF INSULIN (MULTI): Primary | ICD-10-CM

## 2023-11-21 ENCOUNTER — TELEPHONE (OUTPATIENT)
Dept: PRIMARY CARE | Facility: CLINIC | Age: 53
End: 2023-11-21
Payer: COMMERCIAL

## 2023-11-21 NOTE — TELEPHONE ENCOUNTER
Patient needs her blood orders changed from [facility] orders to  orders. So she can get them done at a  lab.

## 2023-12-01 ENCOUNTER — LAB (OUTPATIENT)
Dept: LAB | Facility: LAB | Age: 53
End: 2023-12-01
Payer: COMMERCIAL

## 2023-12-01 DIAGNOSIS — E11.9 TYPE 2 DIABETES MELLITUS WITHOUT COMPLICATION, WITHOUT LONG-TERM CURRENT USE OF INSULIN (MULTI): ICD-10-CM

## 2023-12-01 LAB
ALBUMIN SERPL BCP-MCNC: 4.6 G/DL (ref 3.4–5)
ALP SERPL-CCNC: 54 U/L (ref 33–110)
ALT SERPL W P-5'-P-CCNC: 24 U/L (ref 7–45)
ANION GAP SERPL CALC-SCNC: 13 MMOL/L (ref 10–20)
AST SERPL W P-5'-P-CCNC: 18 U/L (ref 9–39)
BILIRUB SERPL-MCNC: 0.4 MG/DL (ref 0–1.2)
BUN SERPL-MCNC: 12 MG/DL (ref 6–23)
CALCIUM SERPL-MCNC: 10.1 MG/DL (ref 8.6–10.6)
CHLORIDE SERPL-SCNC: 100 MMOL/L (ref 98–107)
CHOLEST SERPL-MCNC: 126 MG/DL (ref 0–199)
CHOLESTEROL/HDL RATIO: 3.6
CO2 SERPL-SCNC: 31 MMOL/L (ref 21–32)
CREAT SERPL-MCNC: 0.68 MG/DL (ref 0.5–1.05)
EST. AVERAGE GLUCOSE BLD GHB EST-MCNC: 160 MG/DL
GFR SERPL CREATININE-BSD FRML MDRD: >90 ML/MIN/1.73M*2
GLUCOSE SERPL-MCNC: 96 MG/DL (ref 74–99)
HBA1C MFR BLD: 7.2 %
HDLC SERPL-MCNC: 34.9 MG/DL
LDLC SERPL CALC-MCNC: 69 MG/DL
NON HDL CHOLESTEROL: 91 MG/DL (ref 0–149)
POTASSIUM SERPL-SCNC: 4 MMOL/L (ref 3.5–5.3)
PROT SERPL-MCNC: 7.7 G/DL (ref 6.4–8.2)
SODIUM SERPL-SCNC: 140 MMOL/L (ref 136–145)
TRIGL SERPL-MCNC: 112 MG/DL (ref 0–149)
VLDL: 22 MG/DL (ref 0–40)

## 2023-12-01 PROCEDURE — 80053 COMPREHEN METABOLIC PANEL: CPT

## 2023-12-01 PROCEDURE — 80061 LIPID PANEL: CPT

## 2023-12-01 PROCEDURE — 36415 COLL VENOUS BLD VENIPUNCTURE: CPT

## 2023-12-01 PROCEDURE — 83036 HEMOGLOBIN GLYCOSYLATED A1C: CPT

## 2023-12-05 ENCOUNTER — OFFICE VISIT (OUTPATIENT)
Dept: PRIMARY CARE | Facility: CLINIC | Age: 53
End: 2023-12-05
Payer: COMMERCIAL

## 2023-12-05 ENCOUNTER — APPOINTMENT (OUTPATIENT)
Dept: NUTRITION | Facility: CLINIC | Age: 53
End: 2023-12-05
Payer: COMMERCIAL

## 2023-12-05 ENCOUNTER — NUTRITION (OUTPATIENT)
Dept: PRIMARY CARE | Facility: CLINIC | Age: 53
End: 2023-12-05
Payer: COMMERCIAL

## 2023-12-05 VITALS — HEIGHT: 70 IN | WEIGHT: 288 LBS | BODY MASS INDEX: 41.23 KG/M2

## 2023-12-05 VITALS
SYSTOLIC BLOOD PRESSURE: 139 MMHG | DIASTOLIC BLOOD PRESSURE: 85 MMHG | TEMPERATURE: 98.3 F | WEIGHT: 288 LBS | HEIGHT: 70 IN | BODY MASS INDEX: 41.23 KG/M2 | HEART RATE: 72 BPM

## 2023-12-05 DIAGNOSIS — E11.9 TYPE 2 DIABETES MELLITUS WITHOUT COMPLICATION, WITHOUT LONG-TERM CURRENT USE OF INSULIN (MULTI): Primary | ICD-10-CM

## 2023-12-05 PROCEDURE — 99213 OFFICE O/P EST LOW 20 MIN: CPT | Performed by: FAMILY MEDICINE

## 2023-12-05 PROCEDURE — 1036F TOBACCO NON-USER: CPT | Performed by: FAMILY MEDICINE

## 2023-12-05 PROCEDURE — 3051F HG A1C>EQUAL 7.0%<8.0%: CPT | Performed by: FAMILY MEDICINE

## 2023-12-05 PROCEDURE — 3008F BODY MASS INDEX DOCD: CPT | Performed by: FAMILY MEDICINE

## 2023-12-05 PROCEDURE — 3075F SYST BP GE 130 - 139MM HG: CPT | Performed by: FAMILY MEDICINE

## 2023-12-05 PROCEDURE — 3048F LDL-C <100 MG/DL: CPT | Performed by: FAMILY MEDICINE

## 2023-12-05 PROCEDURE — 97802 MEDICAL NUTRITION INDIV IN: CPT | Performed by: DIETITIAN, REGISTERED

## 2023-12-05 PROCEDURE — 4010F ACE/ARB THERAPY RXD/TAKEN: CPT | Performed by: FAMILY MEDICINE

## 2023-12-05 PROCEDURE — 3079F DIAST BP 80-89 MM HG: CPT | Performed by: FAMILY MEDICINE

## 2023-12-05 RX ORDER — TIRZEPATIDE 7.5 MG/.5ML
7.5 INJECTION, SOLUTION SUBCUTANEOUS
Qty: 2 ML | Refills: 11 | Status: SHIPPED | OUTPATIENT
Start: 2023-12-05 | End: 2024-04-02 | Stop reason: SDUPTHER

## 2023-12-05 NOTE — PROGRESS NOTES
"Nutrition Assessment     Reason for Visit:  Ana Martel is a 53 y.o. female who presents for Nutrition Counseling and Diabetes    Anthropometrics:  Anthropometrics  Height: 177.8 cm (5' 10\")  Weight: 131 kg (288 lb)  BMI (Calculated): 41.32  Weight Change  Weight History / % Weight Change: 330# (6/12/23), 320# (9/20/23)  Significant weight change: Yes     Food And Nutrient Intake:  Food and Nutrient History  Food and Nutrient History: Patient is here to discuss diet for diabetes. She has been reading about diet online (such as American Diabetes Association) and has been attentive to the relationship between her food choices and blood glucose readings in her CGM. She has noticed that foods with fat (avocado) or protein (high protien yogurt) cause her to have better glycemic control if they are included at meals.  Fluid Intake: water, green tea, or Liquid IV electrolyte packets  Food Allergy: fish and shellfish  Food Intolerance: beef - feels undigested the next day, acid burps, vomits.  GI Symptoms: early satiety (intentional, is taking Mounjaro)  Oral Problems: denies  Dentition: own  Sleep Duration/Quality:  (bed 8-9, wakes up 5)     Food Intake  Meal 1: B: wakes up at 4:30-5. First meal around 10-11 - yogurt (looks for greatest amount of protein, such as [employer] brand), an egg (prepared in a pan or 1 egg from ), and avocado (mashed w/ lemon from s) toast (Brownberry multigrain sandwich thin). Sometimes w/ pistachio.  Meal 2: L: 1 pm Fairlife or Premier Protien shake. Eats pistachios now if she didn't eat them in the morning (2 ounces)  Meal 3: D: 4-5 pm, a salad prepared with 2 ounces lettuce, 1 ounce tomato, avocado, 4 ounces blackened chicken strips, onion, black olives. Or uses yogurt as a dressing w/ taco seasoning.  Snacks: mitzi, blueberries, or celery    Food Preparation  Cooking: Patient  Grocery Shopping: Patient    Food And Nutrient Administration:  Diet Experience  Previous Diet / Nutrition " Education / Counseling: She has met with Sigifredo Monge for DSMES.  Self-selected diet(s) followed: She has been carefully planning food ahead of time,  measuring food with a food scale, tracking everything she eats with [employer].    Cultural considerations: Her family is of Chinese background, she states that there are foods she loves to eat when she returns home ([place] area) and she has found that she is now satisfied with a fraction of the portion she used to eat. She allows herself to eat these foods. Has noticed if she is physically active/takes a walk after a heavier meal her blood sugar levels are better controlled.     Physical Activities:  Physical Activity  Physical Activity History: used to work out every morning at the gym, got at least 5-7K steps duirng that workout, lifted weights and had a  2 days per week. Likes working out first thing in the morning, feels comfortable working out without an audience.  She plans to start back into a habit of exercise in the next month, anticipates using stairclimber, treadmill.  Type of Physical Activity: takes the dog for a walk every night with her daughter.    Nutrition Focused Physical Exam:  Deferred, no signs of malnutrition present.      Nutrition Diagnosis   Malnutrition Diagnosis  Patient has Malnutrition Diagnosis: No  Nutrition Diagnosis  Patient has Nutrition Diagnosis: Yes  Diagnosis Status (1): New  Nutrition Diagnosis 1: Altered nutrition related to laboratory values  Related to (1): endocrine dysfunction  As Evidenced by (1): A1C 7.2% (had been 10.3% 4 months ago)    Nutrition Interventions/Recommendations   Food and Nutrition Delivery  Meals & Snacks: Carbohydrate-modified diet, Modify Composition of Meals/Snacks  Nutrition Education  Nutrition Education Content: Physical activity guidance    Patient Instructions   Utilize the Plate Method at meals in order to balance the types and amounts of food on the plate.   - half of the plate full of  non-starchy vegetables. These foods contribute very little carbohydrate to the plate, and they add fiber to the meal. Salad, carrots, bell peppers, zucchini, broccoli, cauliflower, asparagus, radishes, carrots and green beans are a few examples of these foods. They can be served cooked or raw.    - one quarter of plate including protein foods. Examples can include meat, nuts, seeds, cheese, cottage cheese, nut butter, fish, seafood, tofu, and edamame.    - one quarter of the plate including carbohydrate foods. These foods include grains, fruit, legumes, starchy vegetables, milk and yogurt. Aim to eat at least half of the daily grains as whole grains.     Introduced patient to carbohydrate counting. Explained the following:   - foods that contribute carbohydrate (fruit, grains, legumes, milk/yogurt, starchy vegetables, sugar added to foods, sugar-sweetened beverages)  - foods that contribute no/minimal carbohydrate (protein, fats, non-starchy vegetables)  - how to use portions of food that are 15 grams of carbohydrate to facilitate counting, gave examples of such portions  - how to read a food label to count carbohydrate. Discussed that total carbohydrate is the number she will count. Explained that carbohydrates that are more complex (starch, resistant starch, high-fiber starchy foods) will break down more slowly and make her blood sugar go up more slowly than foods that have sugar in them. Advised limiting added sugars to 40 grams or less per day, it seems that she is eating much less than this amount which is wonderful.   - advised patient to consume 30-45 grams of carbohydrate per meal (or up to 60 gm at the most especially if physically active) and 15 grams of carbohydrate per snack    3. Briefly talked about the importance of making sure that dietary changes are sustainable. Acknowledged the strides she has made in improving her diet and controlling her blood sugars. Talked about how she handles incorporating  foods that she enjoys, and the strategies she uses to mitigate carbohydrate intake if she plans to enjoy a more indulgent food. She seems to have good plans in place and has a positive outlook about sustaining this diet.       Nutrition Monitoring and Evaluation   Food/Nutrient Related History Monitoring  Monitoring and Evaluation Plan: Amount of food, Meal/snack pattern, Carbohydrate intake  Amount of Food: Estimated amout of food, Types of food  Criteria: She will use the Plate Method to guide the amount/type of foods on her plate  Meal/Snack Pattern: Estimated meal and snack pattern  Criteria: She will continue to eat 3 times per day so that food/carbohydrate intake is spread through the day  Estimated carbohydrate intake: Estimated carbohydrate intake  Criteria: She will aim to eat about 30-45 gm carbohydrate per meal, 3 times per day  Knowledge/Beliefs/Attitudes  Monitoring and Evaluation Plan: Physical activity  Physical activity: Consistency  Criteria: She will follow through with the plan to re-start a regular workout routine within the next month    Handouts: Plate Method, Planning Healthy Meals, also suggested check out [employer].org for whole grain recipes, and diatribe.org for diabetes articles/recipes. Told her about Carbs & Cals lee.      Readiness to Change : Excellent  Level of Understanding : Excellent  Anticipated Compliant : Excellent

## 2023-12-05 NOTE — PROGRESS NOTES
"Subjective   Patient ID: Ana Martel is a 53 y.o. female who presents for Diabetes.  Very pleasant 53-year-old recently diagnosed with type 2 diabetes  A1c was originally 12  She was started on Mounjaro and is doing very well  She is tolerating the medicine well has had no episodes of hypo or hyperglycemia  She has lost some weight  She has been modifying her eating  Her blood sugars on average have gone down significantly and are mostly below 200        Review of Systems  Constitutional: no chills, no fever and no night sweats.   Eyes: no blurred vision and no eyesight problems.   ENT: no hearing loss, no nasal congestion, no nasal discharge, no hoarseness and no sore throat.   Cardiovascular: no chest pain, no intermittent leg claudication, no lower extremity edema, no palpitations and no syncope.   Respiratory: no cough, no shortness of breath during exertion, no shortness of breath at rest and no wheezing.   Gastrointestinal: no abdominal pain, no blood in stools, no constipation, no diarrhea, no melena, no nausea, no rectal pain and no vomiting.   Genitourinary: no dysuria, no change in urinary frequency, no urinary hesitancy, no feelings of urinary urgency and no vaginal discharge.   Musculoskeletal: no arthralgias,  no back pain and no myalgias.   Integumentary: no new skin lesions and no rashes.   Neurological: no difficulty walking, no headache, no limb weakness, no numbness and no tingling.   Psychiatric: no anxiety, no depression, no anhedonia and no substance use disorders.   Endocrine: no recent weight gain and no recent weight loss.   Hematologic/Lymphatic: no tendency for easy bruising and no swollen glands .    Objective    /85   Pulse 72   Temp 36.8 °C (98.3 °F)   Ht 1.778 m (5' 10\")   Wt 131 kg (288 lb)   BMI 41.32 kg/m²    Physical Exam  The patient appeared well nourished and normally developed. Vital signs as documented. Head exam is unremarkable. No scleral icterus or corneal " arcus noted.  Pupils are equal round reactive to light extraocular movements are intact no hemorrhages noted on funduscopic exam mouth mucous membranes are moist no exudates ears canals clear TMs are gray pearly not injected nose no rhinorrhea or epistaxis Neck is without jugular venous distension, thyromegaly, or carotid bruits. Carotid upstrokes are brisk bilaterally. Lungs are clear to auscultation and percussion. Cardiac exam reveals the PMI to be normally sized and situated. Rhythm is regular. First and second heart sounds normal. No murmurs, rubs or gallops. Abdominal exam reveals normal bowel sounds, no masses, no organomegaly and no aortic enlargement. Extremities are nonedematous and both femoral and pedal pulses are normal.  Neurologic exam DTRs are equal bilaterally no focal deficits strength is symmetrical heme lymph no palpable lymph nodes in the neck axilla or groin  Bare foot exam good pulses good light touch good capillary refill  Assessment/Plan   Problem List Items Addressed This Visit       Type 2 diabetes mellitus without complication (CMS/HCC) - Primary     Stable based on symptoms and exam  Increase Mounjaro to 7.5 mg weekly  Recheck blood work and follow-up in 3 months         Relevant Medications    tirzepatide (Mounjaro) 7.5 mg/0.5 mL pen injector            Sully Hill MD

## 2023-12-05 NOTE — PATIENT INSTRUCTIONS
Utilize the Plate Method at meals in order to balance the types and amounts of food on the plate.   - half of the plate full of non-starchy vegetables. These foods contribute very little carbohydrate to the plate, and they add fiber to the meal. Salad, carrots, bell peppers, zucchini, broccoli, cauliflower, asparagus, radishes, carrots and green beans are a few examples of these foods. They can be served cooked or raw.    - one quarter of plate including protein foods. Examples can include meat, nuts, seeds, cheese, cottage cheese, nut butter, fish, seafood, tofu, and edamame.    - one quarter of the plate including carbohydrate foods. These foods include grains, fruit, legumes, starchy vegetables, milk and yogurt. Aim to eat at least half of the daily grains as whole grains.     Introduced patient to carbohydrate counting. Explained the following:   - foods that contribute carbohydrate (fruit, grains, legumes, milk/yogurt, starchy vegetables, sugar added to foods, sugar-sweetened beverages)  - foods that contribute no/minimal carbohydrate (protein, fats, non-starchy vegetables)  - how to use portions of food that are 15 grams of carbohydrate to facilitate counting, gave examples of such portions  - how to read a food label to count carbohydrate. Discussed that total carbohydrate is the number she will count. Explained that carbohydrates that are more complex (starch, resistant starch, high-fiber starchy foods) will break down more slowly and make her blood sugar go up more slowly than foods that have sugar in them. Advised limiting added sugars to 40 grams or less per day, it seems that she is eating much less than this amount which is wonderful.   - advised patient to consume 30-45 grams of carbohydrate per meal (or up to 60 gm at the most especially if physically active) and 15 grams of carbohydrate per snack    3. Briefly talked about the importance of making sure that dietary changes are sustainable.  Acknowledged the strides she has made in improving her diet and controlling her blood sugars. Talked about how she handles incorporating foods that she enjoys, and the strategies she uses to mitigate carbohydrate intake if she plans to enjoy a more indulgent food. She seems to have good plans in place and has a positive outlook about sustaining this diet.

## 2023-12-29 NOTE — ASSESSMENT & PLAN NOTE
Stable based on symptoms and exam  Increase Mounjaro to 7.5 mg weekly  Recheck blood work and follow-up in 3 months

## 2024-01-12 DIAGNOSIS — E78.5 HYPERLIPIDEMIA, UNSPECIFIED HYPERLIPIDEMIA TYPE: ICD-10-CM

## 2024-01-12 RX ORDER — ATORVASTATIN CALCIUM 20 MG/1
20 TABLET, FILM COATED ORAL DAILY
Qty: 90 TABLET | Refills: 3 | Status: SHIPPED | OUTPATIENT
Start: 2024-01-12

## 2024-01-29 ENCOUNTER — APPOINTMENT (OUTPATIENT)
Dept: PRIMARY CARE | Facility: CLINIC | Age: 54
End: 2024-01-29
Payer: COMMERCIAL

## 2024-02-09 ENCOUNTER — HOSPITAL ENCOUNTER (EMERGENCY)
Facility: HOSPITAL | Age: 54
Discharge: HOME | End: 2024-02-09
Payer: COMMERCIAL

## 2024-02-09 ENCOUNTER — APPOINTMENT (OUTPATIENT)
Dept: RADIOLOGY | Facility: HOSPITAL | Age: 54
End: 2024-02-09
Payer: COMMERCIAL

## 2024-02-09 VITALS
SYSTOLIC BLOOD PRESSURE: 128 MMHG | HEIGHT: 70 IN | RESPIRATION RATE: 16 BRPM | DIASTOLIC BLOOD PRESSURE: 70 MMHG | HEART RATE: 71 BPM | BODY MASS INDEX: 38.08 KG/M2 | TEMPERATURE: 97 F | OXYGEN SATURATION: 98 % | WEIGHT: 266 LBS

## 2024-02-09 DIAGNOSIS — D25.9 UTERINE LEIOMYOMA, UNSPECIFIED LOCATION: ICD-10-CM

## 2024-02-09 DIAGNOSIS — K57.92 DIVERTICULITIS: Primary | ICD-10-CM

## 2024-02-09 DIAGNOSIS — R91.1 PULMONARY NODULE: ICD-10-CM

## 2024-02-09 LAB
ALBUMIN SERPL BCP-MCNC: 4.2 G/DL (ref 3.4–5)
ALP SERPL-CCNC: 45 U/L (ref 33–110)
ALT SERPL W P-5'-P-CCNC: 15 U/L (ref 7–45)
ANION GAP SERPL CALC-SCNC: 12 MMOL/L (ref 10–20)
APPEARANCE UR: ABNORMAL
AST SERPL W P-5'-P-CCNC: 15 U/L (ref 9–39)
BACTERIA #/AREA URNS AUTO: ABNORMAL /HPF
BASOPHILS # BLD AUTO: 0.06 X10*3/UL (ref 0–0.1)
BASOPHILS NFR BLD AUTO: 0.5 %
BILIRUB SERPL-MCNC: 0.4 MG/DL (ref 0–1.2)
BILIRUB UR STRIP.AUTO-MCNC: NEGATIVE MG/DL
BUN SERPL-MCNC: 19 MG/DL (ref 6–23)
CALCIUM SERPL-MCNC: 9.5 MG/DL (ref 8.6–10.3)
CHLORIDE SERPL-SCNC: 100 MMOL/L (ref 98–107)
CO2 SERPL-SCNC: 28 MMOL/L (ref 21–32)
COLOR UR: YELLOW
CREAT SERPL-MCNC: 0.75 MG/DL (ref 0.5–1.05)
EGFRCR SERPLBLD CKD-EPI 2021: >90 ML/MIN/1.73M*2
EOSINOPHIL # BLD AUTO: 0.28 X10*3/UL (ref 0–0.7)
EOSINOPHIL NFR BLD AUTO: 2.3 %
ERYTHROCYTE [DISTWIDTH] IN BLOOD BY AUTOMATED COUNT: 18 % (ref 11.5–14.5)
GLUCOSE SERPL-MCNC: 117 MG/DL (ref 74–99)
GLUCOSE UR STRIP.AUTO-MCNC: NEGATIVE MG/DL
HCT VFR BLD AUTO: 42.4 % (ref 36–46)
HGB BLD-MCNC: 13 G/DL (ref 12–16)
IMM GRANULOCYTES # BLD AUTO: 0.04 X10*3/UL (ref 0–0.7)
IMM GRANULOCYTES NFR BLD AUTO: 0.3 % (ref 0–0.9)
KETONES UR STRIP.AUTO-MCNC: NEGATIVE MG/DL
LACTATE SERPL-SCNC: 0.8 MMOL/L (ref 0.4–2)
LEUKOCYTE ESTERASE UR QL STRIP.AUTO: ABNORMAL
LIPASE SERPL-CCNC: 24 U/L (ref 9–82)
LYMPHOCYTES # BLD AUTO: 3.31 X10*3/UL (ref 1.2–4.8)
LYMPHOCYTES NFR BLD AUTO: 26.8 %
MCH RBC QN AUTO: 20.1 PG (ref 26–34)
MCHC RBC AUTO-ENTMCNC: 30.7 G/DL (ref 32–36)
MCV RBC AUTO: 65 FL (ref 80–100)
MONOCYTES # BLD AUTO: 0.82 X10*3/UL (ref 0.1–1)
MONOCYTES NFR BLD AUTO: 6.6 %
MUCOUS THREADS #/AREA URNS AUTO: ABNORMAL /LPF
NEUTROPHILS # BLD AUTO: 7.84 X10*3/UL (ref 1.2–7.7)
NEUTROPHILS NFR BLD AUTO: 63.5 %
NITRITE UR QL STRIP.AUTO: NEGATIVE
NRBC BLD-RTO: 0 /100 WBCS (ref 0–0)
PH UR STRIP.AUTO: 5 [PH]
PLATELET # BLD AUTO: 282 X10*3/UL (ref 150–450)
POTASSIUM SERPL-SCNC: 3.9 MMOL/L (ref 3.5–5.3)
PROT SERPL-MCNC: 7.3 G/DL (ref 6.4–8.2)
PROT UR STRIP.AUTO-MCNC: NEGATIVE MG/DL
RBC # BLD AUTO: 6.48 X10*6/UL (ref 4–5.2)
RBC # UR STRIP.AUTO: ABNORMAL /UL
RBC #/AREA URNS AUTO: ABNORMAL /HPF
SODIUM SERPL-SCNC: 136 MMOL/L (ref 136–145)
SP GR UR STRIP.AUTO: 1.02
SQUAMOUS #/AREA URNS AUTO: ABNORMAL /HPF
UROBILINOGEN UR STRIP.AUTO-MCNC: <2 MG/DL
WBC # BLD AUTO: 12.4 X10*3/UL (ref 4.4–11.3)
WBC #/AREA URNS AUTO: ABNORMAL /HPF

## 2024-02-09 PROCEDURE — 83690 ASSAY OF LIPASE: CPT | Performed by: NURSE PRACTITIONER

## 2024-02-09 PROCEDURE — 2500000001 HC RX 250 WO HCPCS SELF ADMINISTERED DRUGS (ALT 637 FOR MEDICARE OP): Performed by: PHYSICIAN ASSISTANT

## 2024-02-09 PROCEDURE — 81001 URINALYSIS AUTO W/SCOPE: CPT | Performed by: NURSE PRACTITIONER

## 2024-02-09 PROCEDURE — 74177 CT ABD & PELVIS W/CONTRAST: CPT

## 2024-02-09 PROCEDURE — 99284 EMERGENCY DEPT VISIT MOD MDM: CPT | Mod: 25

## 2024-02-09 PROCEDURE — 85025 COMPLETE CBC W/AUTO DIFF WBC: CPT | Performed by: NURSE PRACTITIONER

## 2024-02-09 PROCEDURE — 2550000001 HC RX 255 CONTRASTS: Performed by: NURSE PRACTITIONER

## 2024-02-09 PROCEDURE — 80053 COMPREHEN METABOLIC PANEL: CPT | Performed by: NURSE PRACTITIONER

## 2024-02-09 PROCEDURE — 36415 COLL VENOUS BLD VENIPUNCTURE: CPT | Performed by: NURSE PRACTITIONER

## 2024-02-09 PROCEDURE — 99284 EMERGENCY DEPT VISIT MOD MDM: CPT | Mod: 25,27 | Performed by: PHYSICIAN ASSISTANT

## 2024-02-09 PROCEDURE — 87086 URINE CULTURE/COLONY COUNT: CPT | Mod: PARLAB | Performed by: NURSE PRACTITIONER

## 2024-02-09 PROCEDURE — 83605 ASSAY OF LACTIC ACID: CPT | Performed by: NURSE PRACTITIONER

## 2024-02-09 PROCEDURE — 74177 CT ABD & PELVIS W/CONTRAST: CPT | Performed by: STUDENT IN AN ORGANIZED HEALTH CARE EDUCATION/TRAINING PROGRAM

## 2024-02-09 RX ORDER — AMOXICILLIN AND CLAVULANATE POTASSIUM 875; 125 MG/1; MG/1
1 TABLET, FILM COATED ORAL EVERY 12 HOURS
Qty: 20 TABLET | Refills: 0 | Status: SHIPPED | OUTPATIENT
Start: 2024-02-09 | End: 2024-02-19

## 2024-02-09 RX ORDER — OXYCODONE AND ACETAMINOPHEN 5; 325 MG/1; MG/1
1 TABLET ORAL ONCE
Status: COMPLETED | OUTPATIENT
Start: 2024-02-09 | End: 2024-02-09

## 2024-02-09 RX ORDER — OXYCODONE AND ACETAMINOPHEN 5; 325 MG/1; MG/1
1 TABLET ORAL EVERY 6 HOURS PRN
Qty: 5 TABLET | Refills: 0 | Status: SHIPPED | OUTPATIENT
Start: 2024-02-09 | End: 2024-02-12

## 2024-02-09 RX ORDER — AMOXICILLIN AND CLAVULANATE POTASSIUM 875; 125 MG/1; MG/1
1 TABLET, FILM COATED ORAL ONCE
Status: COMPLETED | OUTPATIENT
Start: 2024-02-09 | End: 2024-02-09

## 2024-02-09 RX ORDER — NAPROXEN 500 MG/1
500 TABLET ORAL
Qty: 30 TABLET | Refills: 0 | Status: SHIPPED | OUTPATIENT
Start: 2024-02-09 | End: 2024-02-24

## 2024-02-09 RX ADMIN — OXYCODONE HYDROCHLORIDE AND ACETAMINOPHEN 1 TABLET: 5; 325 TABLET ORAL at 22:27

## 2024-02-09 RX ADMIN — IOHEXOL 75 ML: 350 INJECTION, SOLUTION INTRAVENOUS at 21:24

## 2024-02-09 RX ADMIN — AMOXICILLIN AND CLAVULANATE POTASSIUM 1 TABLET: 875; 125 TABLET, FILM COATED ORAL at 22:27

## 2024-02-09 ASSESSMENT — PAIN - FUNCTIONAL ASSESSMENT: PAIN_FUNCTIONAL_ASSESSMENT: 0-10

## 2024-02-09 ASSESSMENT — PAIN DESCRIPTION - FREQUENCY: FREQUENCY: INTERMITTENT

## 2024-02-09 ASSESSMENT — PAIN SCALES - GENERAL: PAINLEVEL_OUTOF10: 6

## 2024-02-09 ASSESSMENT — PAIN DESCRIPTION - LOCATION: LOCATION: ABDOMEN

## 2024-02-09 ASSESSMENT — COLUMBIA-SUICIDE SEVERITY RATING SCALE - C-SSRS
2. HAVE YOU ACTUALLY HAD ANY THOUGHTS OF KILLING YOURSELF?: NO
1. IN THE PAST MONTH, HAVE YOU WISHED YOU WERE DEAD OR WISHED YOU COULD GO TO SLEEP AND NOT WAKE UP?: NO
6. HAVE YOU EVER DONE ANYTHING, STARTED TO DO ANYTHING, OR PREPARED TO DO ANYTHING TO END YOUR LIFE?: NO

## 2024-02-09 ASSESSMENT — PAIN DESCRIPTION - PAIN TYPE: TYPE: ACUTE PAIN

## 2024-02-10 LAB — HOLD SPECIMEN: NORMAL

## 2024-02-10 NOTE — ED TRIAGE NOTES
Secondary to patient volumes and overcrowding, I performed a brief medical screening exam of the patient in triage, as the patient awaits space in the main ED.    History of Present Illness:  Ana Martel presents with   Chief Complaint   Patient presents with    Abdominal Pain     54 y/o female complains of several days of left side abd pain with radiation to rlq. Pain became more intense today. Last bm this morning and normal.       Physical Exam:  General - In no acute distress  Respiratory - Breathing comfortably  Neurologic - Moving all extremities  Abdomen-bowel sounds present, no CVAT, left lower quadrant tenderness.    Medical Decision Making:  Patient will require further evaluation in the main ED.    Initial diagnostic tests were ordered from triage.    The patient demonstrates understanding that this initial evaluation is a brief medical screening exam and the expectation is that they await for space in the main ED to be further evaluated.  The patient understands that, if they leave prior to further evaluation in the main ED after this initial evaluation in triage, they are doing so under their own accord knowing that their evaluation/work-up is not yet complete. The patient also understands that any preliminary diagnostic results, including abnormalities, may not be shared with them, if they choose to leave prior to further evaluation in the main ED.

## 2024-02-11 LAB — BACTERIA UR CULT: NORMAL

## 2024-03-11 ENCOUNTER — APPOINTMENT (OUTPATIENT)
Dept: GASTROENTEROLOGY | Facility: CLINIC | Age: 54
End: 2024-03-11
Payer: COMMERCIAL

## 2024-03-11 ENCOUNTER — OFFICE VISIT (OUTPATIENT)
Dept: PRIMARY CARE | Facility: CLINIC | Age: 54
End: 2024-03-11
Payer: COMMERCIAL

## 2024-03-11 VITALS
DIASTOLIC BLOOD PRESSURE: 66 MMHG | WEIGHT: 264 LBS | HEART RATE: 65 BPM | HEIGHT: 70 IN | OXYGEN SATURATION: 98 % | SYSTOLIC BLOOD PRESSURE: 134 MMHG | TEMPERATURE: 97.5 F | BODY MASS INDEX: 37.8 KG/M2

## 2024-03-11 DIAGNOSIS — Z12.11 COLON CANCER SCREENING: ICD-10-CM

## 2024-03-11 DIAGNOSIS — E11.9 TYPE 2 DIABETES MELLITUS WITHOUT COMPLICATION, WITHOUT LONG-TERM CURRENT USE OF INSULIN (MULTI): Primary | ICD-10-CM

## 2024-03-11 DIAGNOSIS — E66.01 CLASS 2 SEVERE OBESITY WITH SERIOUS COMORBIDITY AND BODY MASS INDEX (BMI) OF 37.0 TO 37.9 IN ADULT, UNSPECIFIED OBESITY TYPE (MULTI): ICD-10-CM

## 2024-03-11 LAB — POC HEMOGLOBIN A1C: 6.8 % (ref 4.2–6.5)

## 2024-03-11 PROCEDURE — 3078F DIAST BP <80 MM HG: CPT | Performed by: FAMILY MEDICINE

## 2024-03-11 PROCEDURE — 3008F BODY MASS INDEX DOCD: CPT | Performed by: FAMILY MEDICINE

## 2024-03-11 PROCEDURE — 1036F TOBACCO NON-USER: CPT | Performed by: FAMILY MEDICINE

## 2024-03-11 PROCEDURE — 3075F SYST BP GE 130 - 139MM HG: CPT | Performed by: FAMILY MEDICINE

## 2024-03-11 PROCEDURE — 99213 OFFICE O/P EST LOW 20 MIN: CPT | Performed by: FAMILY MEDICINE

## 2024-03-11 PROCEDURE — 83036 HEMOGLOBIN GLYCOSYLATED A1C: CPT | Performed by: FAMILY MEDICINE

## 2024-03-11 PROCEDURE — 4010F ACE/ARB THERAPY RXD/TAKEN: CPT | Performed by: FAMILY MEDICINE

## 2024-03-11 NOTE — PROGRESS NOTES
Subjective   Patient ID: Ana Martel is a 54 y.o. female who presents for Follow-up (Paperwork, FU, bw orders 3 month check).  Very pleasant 54-year-old with history of diabetes hypertension hyperlipidemia and heart disease here today for follow-up diabetes  She has been doing significantly better since she has started the Mounjaro her diabetes control is very good she has no symptoms of hypo or hyperglycemia or ketoacidosis and she has been losing weight adjunctively with combination of the medication and diet and exercise  No angina palpitations or syncope no lateralizing weakness no blurry or double vision no hypo or hyperglycemia and no issues    Diabetes  She presents for her follow-up diabetic visit. She has type 2 diabetes mellitus. No MedicAlert identification noted. Her disease course has been improving. Pertinent negatives for hypoglycemia include no confusion, dizziness, headaches, hunger, mood changes, nervousness/anxiousness, pallor, seizures, sleepiness, speech difficulty, sweats or tremors. Pertinent negatives for diabetes include no blurred vision, no chest pain, no fatigue, no foot paresthesias, no foot ulcerations, no polydipsia, no polyphagia, no polyuria, no visual change, no weakness and no weight loss. Pertinent negatives for hypoglycemia complications include no blackouts, no hospitalization, no nocturnal hypoglycemia, no required assistance and no required glucagon injection. Symptoms are improving. Pertinent negatives for diabetic complications include no autonomic neuropathy, CVA, heart disease, impotence, nephropathy, peripheral neuropathy, PVD or retinopathy. Risk factors for coronary artery disease include dyslipidemia, family history, hypertension, post-menopausal, obesity and diabetes mellitus. Current diabetic treatment includes diet and oral agent (dual therapy). She is compliant with treatment all of the time. Her weight is decreasing steadily. She is following a generally  healthy and diabetic diet. Meal planning includes avoidance of concentrated sweets and carbohydrate counting. She has not had a previous visit with a dietitian. She participates in exercise daily. Her home blood glucose trend is decreasing steadily. Her overall blood glucose range is 130-140 mg/dl. An ACE inhibitor/angiotensin II receptor blocker is being taken. She does not see a podiatrist.Eye exam is current.       Review of Systems   Constitutional:  Negative for fatigue and weight loss.   Eyes:  Negative for blurred vision.   Cardiovascular:  Negative for chest pain.   Endocrine: Negative for polydipsia, polyphagia and polyuria.   Genitourinary:  Negative for impotence.   Skin:  Negative for pallor.   Neurological:  Negative for dizziness, tremors, seizures, speech difficulty, weakness and headaches.   Psychiatric/Behavioral:  Negative for confusion. The patient is not nervous/anxious.      Constitutional: no chills, no fever and no night sweats.   Eyes: no blurred vision and no eyesight problems.   ENT: no hearing loss, no nasal congestion, no nasal discharge, no hoarseness and no sore throat.   Cardiovascular: no chest pain, no intermittent leg claudication, no lower extremity edema, no palpitations and no syncope.   Respiratory: no cough, no shortness of breath during exertion, no shortness of breath at rest and no wheezing.   Gastrointestinal: no abdominal pain, no blood in stools, no constipation, no diarrhea, no melena, no nausea, no rectal pain and no vomiting.   Genitourinary: no dysuria, no change in urinary frequency, no urinary hesitancy, no feelings of urinary urgency and no vaginal discharge.   Musculoskeletal: no arthralgias,  no back pain and no myalgias.   Integumentary: no new skin lesions and no rashes.   Neurological: no difficulty walking, no headache, no limb weakness, no numbness and no tingling.   Psychiatric: no anxiety, no depression, no anhedonia and no substance use disorders.  "  Endocrine: no recent weight gain and no recent weight loss.   Hematologic/Lymphatic: no tendency for easy bruising and no swollen glands .    Objective    /66   Pulse 65   Temp 36.4 °C (97.5 °F)   Ht 1.778 m (5' 10\")   Wt 120 kg (264 lb)   LMP  (LMP Unknown)   SpO2 98%   BMI 37.88 kg/m²    Physical Exam  The patient appeared well nourished and normally developed. Vital signs as documented. Head exam is unremarkable. No scleral icterus or corneal arcus noted.  Pupils are equal round reactive to light extraocular movements are intact no hemorrhages noted on funduscopic exam mouth mucous membranes are moist no exudates ears canals clear TMs are gray pearly not injected nose no rhinorrhea or epistaxis Neck is without jugular venous distension, thyromegaly, or carotid bruits. Carotid upstrokes are brisk bilaterally. Lungs are clear to auscultation and percussion. Cardiac exam reveals the PMI to be normally sized and situated. Rhythm is regular. First and second heart sounds normal. No murmurs, rubs or gallops. Abdominal exam reveals normal bowel sounds, no masses, no organomegaly and no aortic enlargement. Extremities are nonedematous and both femoral and pedal pulses are normal.  Neurologic exam DTRs are equal bilaterally no focal deficits strength is symmetrical heme lymph no palpable lymph nodes in the neck axilla or groin.  Foot exam good pulses good light touch good capillary refill no ulcers    Assessment/Plan   Problem List Items Addressed This Visit       Class 2 severe obesity with serious comorbidity and body mass index (BMI) of 37.0 to 37.9 in adult (CMS/HCC)     Above normal BMI nutrition and physical activity reviewed         Type 2 diabetes mellitus without complication, without long-term current use of insulin (CMS/HCC) - Primary     Stable and improving  No current symptoms  Tolerating Mounjaro well  Continue current medication and management and follow-up every 6 months         Relevant " Orders    POCT glycosylated hemoglobin (Hb A1C) manually resulted (Completed)     Other Visit Diagnoses       Colon cancer screening        Relevant Orders    Cologuard® colon cancer screening                 Sully Hill MD

## 2024-03-25 ENCOUNTER — OFFICE VISIT (OUTPATIENT)
Dept: PULMONOLOGY | Facility: HOSPITAL | Age: 54
End: 2024-03-25
Payer: COMMERCIAL

## 2024-03-25 ENCOUNTER — APPOINTMENT (OUTPATIENT)
Dept: GASTROENTEROLOGY | Facility: CLINIC | Age: 54
End: 2024-03-25
Payer: COMMERCIAL

## 2024-03-25 VITALS
DIASTOLIC BLOOD PRESSURE: 82 MMHG | OXYGEN SATURATION: 93 % | BODY MASS INDEX: 37.74 KG/M2 | WEIGHT: 263 LBS | TEMPERATURE: 97.2 F | HEART RATE: 72 BPM | SYSTOLIC BLOOD PRESSURE: 127 MMHG

## 2024-03-25 DIAGNOSIS — R91.1 PULMONARY NODULE: Primary | ICD-10-CM

## 2024-03-25 PROBLEM — I26.99 PULMONARY EMBOLISM (MULTI): Status: RESOLVED | Noted: 2023-06-25 | Resolved: 2024-03-25

## 2024-03-25 PROCEDURE — 3079F DIAST BP 80-89 MM HG: CPT | Performed by: NURSE PRACTITIONER

## 2024-03-25 PROCEDURE — 1036F TOBACCO NON-USER: CPT | Performed by: NURSE PRACTITIONER

## 2024-03-25 PROCEDURE — 99213 OFFICE O/P EST LOW 20 MIN: CPT | Performed by: NURSE PRACTITIONER

## 2024-03-25 PROCEDURE — 99203 OFFICE O/P NEW LOW 30 MIN: CPT | Performed by: NURSE PRACTITIONER

## 2024-03-25 PROCEDURE — 3008F BODY MASS INDEX DOCD: CPT | Performed by: NURSE PRACTITIONER

## 2024-03-25 PROCEDURE — 3074F SYST BP LT 130 MM HG: CPT | Performed by: NURSE PRACTITIONER

## 2024-03-25 PROCEDURE — 4010F ACE/ARB THERAPY RXD/TAKEN: CPT | Performed by: NURSE PRACTITIONER

## 2024-03-25 RX ORDER — BISMUTH SUBSALICYLATE 262 MG
1 TABLET,CHEWABLE ORAL DAILY
COMMUNITY

## 2024-03-25 RX ORDER — VALACYCLOVIR HYDROCHLORIDE 1 G/1
TABLET, FILM COATED ORAL
COMMUNITY

## 2024-03-25 RX ORDER — BACLOFEN 20 MG
1 TABLET ORAL DAILY
COMMUNITY

## 2024-03-25 SDOH — ECONOMIC STABILITY: FOOD INSECURITY: WITHIN THE PAST 12 MONTHS, THE FOOD YOU BOUGHT JUST DIDN'T LAST AND YOU DIDN'T HAVE MONEY TO GET MORE.: NEVER TRUE

## 2024-03-25 SDOH — ECONOMIC STABILITY: FOOD INSECURITY: WITHIN THE PAST 12 MONTHS, YOU WORRIED THAT YOUR FOOD WOULD RUN OUT BEFORE YOU GOT MONEY TO BUY MORE.: NEVER TRUE

## 2024-03-25 ASSESSMENT — ENCOUNTER SYMPTOMS
ABDOMINAL PAIN: 0
JOINT SWELLING: 0
PALPITATIONS: 0
NUMBNESS: 0
ARTHRALGIAS: 0
VOICE CHANGE: 0
SINUS PRESSURE: 0
NAUSEA: 0
WEAKNESS: 0
DIZZINESS: 0
FEVER: 0
MYALGIAS: 0
HEADACHES: 0
DIAPHORESIS: 1
RHINORRHEA: 0
AGITATION: 0
BACK PAIN: 0
NERVOUS/ANXIOUS: 0
DIARRHEA: 0
FATIGUE: 0
VOMITING: 0
EYE PAIN: 0

## 2024-03-25 ASSESSMENT — LIFESTYLE VARIABLES
SKIP TO QUESTIONS 9-10: 1
HOW OFTEN DO YOU HAVE SIX OR MORE DRINKS ON ONE OCCASION: NEVER
HOW MANY STANDARD DRINKS CONTAINING ALCOHOL DO YOU HAVE ON A TYPICAL DAY: 1 OR 2
AUDIT-C TOTAL SCORE: 1
HOW OFTEN DO YOU HAVE A DRINK CONTAINING ALCOHOL: MONTHLY OR LESS

## 2024-03-25 ASSESSMENT — PATIENT HEALTH QUESTIONNAIRE - PHQ9
1. LITTLE INTEREST OR PLEASURE IN DOING THINGS: NOT AT ALL
SUM OF ALL RESPONSES TO PHQ9 QUESTIONS 1 & 2: 0
2. FEELING DOWN, DEPRESSED OR HOPELESS: NOT AT ALL

## 2024-03-25 ASSESSMENT — PAIN SCALES - GENERAL: PAINLEVEL: 0-NO PAIN

## 2024-03-25 NOTE — PATIENT INSTRUCTIONS
Pulmonary nodule: 8mm LLL - stable from 4/2022 - 2/2024   - stable for 2 years - no further follow up needed     2. Mosaic attenuation on CT chest:   - no symptoms - no need for PFTs   - can continue as needed albuterol     Thank you for visiting the Pulmonary clinic today!   Return to clinic - as needed   Nickie Moy CNP  My office -  [phone]- Gael is my  and Megan is my nurse.   Radiology scheduling [phone]   Appointment scheduling [phone]   Pulmonary function testing - [phone]

## 2024-03-25 NOTE — PROGRESS NOTES
Patient: Ana Martel    [id number]  : 1970 -- AGE 54 y.o.    Provider: TIGIST Hanna-CNP     Location [facility]   Service Date: 3/25/2024              [facility]  New Visit Note      HISTORY OF PRESENT ILLNESS     The patient's referring provider is: Rolando Saenz PA-C    HISTORY OF PRESENT ILLNESS   Ana Martel is a 54 y.o. female who presents to a [facility] for an evaluation with concerns of Pt here today for NPV. and Lung Nodule. I have independently interviewed and examined the patient in the office and reviewed available records.    Current History    She said she used to always get bronchitis/ walking pneumoina in the fall with the seasons changing. Seh hasn ot had this the last few years.  She was in [facility] -- states seh could not catch her breath to even talk and felt lightheaded. The only thing she felt was out of the ordinary was was she repotted a bonzai tree. She had a ton of tests and was told everything was normal She is not sure if she had any wheezing. She will at times notice some wheezing when she lays down at night. She denies any cough, wheezing, SOB at rest, GERD, or CP. She has COSME with going up hill or exercising. She has seasonal allergies for which she will take claritin PRN.     Previous pulmonary history: She has no history of recurrent infections, or lung disease as a child.     Inhalers/nebulized medications: none     Hospitalization History: She has not been hospitalized over the last year for breathing related problem.    Sleep history: She has been told she snores - denies witnessed apneas. Wakes up feeling rested.       ALLERGIES AND MEDICATIONS     ALLERGIES  Allergies   Allergen Reactions    Pineapple Hives    Scallops Unknown    Shellfish Containing Products Hives       MEDICATIONS  Current Outpatient Medications   Medication Sig Dispense Refill     atorvastatin (Lipitor) 20 mg tablet TAKE 1 TABLET DAILY 90 tablet 3    blood-glucose sensor (Dexcom G7 Sensor) device Inject 1 each under the skin early in the morning.. 10 each 11    Dexcom G7  misc       tirzepatide (Mounjaro) 7.5 mg/0.5 mL pen injector Inject 7.5 mg under the skin every 7 days. 2 mL 11    valsartan (Diovan) 160 mg tablet Take 1 tablet (160 mg) by mouth once daily. 90 tablet 3    metFORMIN (Glucophage) 1,000 mg tablet Take 1 tablet (1,000 mg) by mouth 2 times a day with meals. 180 tablet 3    methocarbamol (Robaxin) 500 mg tablet Take 1 tablet (500 mg) by mouth 2 times a day for 10 days. 20 tablet 2    metoprolol succinate XL (Toprol-XL) 25 mg 24 hr tablet Take 1 tablet (25 mg) by mouth once daily. 90 tablet 3    tirzepatide (Mounjaro) 5 mg/0.5 mL pen injector Inject 5 mg under the skin 1 (one) time per week. 2 mL 3    triamterene (DYRENIUM ORAL)       triamterene-hydrochlorothiazid (Maxzide-25) 37.5-25 mg tablet TAKE 1 TABLET DAILY 90 tablet 3     No current facility-administered medications for this visit.         PAST HISTORY     PAST MEDICAL HISTORY  - HTN   - HLD   - DMII   - diverticulosis   - seasonal allergies     PAST SURGICAL HISTORY  Past Surgical History:   Procedure Laterality Date     SECTION, CLASSIC  2014     Section    TONSILLECTOMY  2014    Tonsillectomy       IMMUNIZATION HISTORY  Immunization History   Administered Date(s) Administered    Flu vaccine (IIV4), preservative free *Check age/dose* 2022, 10/21/2023    Influenza, Unspecified 2016, 2017, 2019, 2020    Moderna COVID-19 vaccine, Fall , 12 yeasrs and older (50mcg/0.5mL) 10/21/2023    Pfizer COVID-19 vaccine, bivalent, age 12 years and older (30 mcg/0.3 mL) 2022    Pfizer Purple Cap SARS-CoV-2 2021, 2021, 10/08/2021    Tdap vaccine, age 7 year and older (BOOSTRIX, ADACEL) 2017       SOCIAL HISTORY  Smokin-24 4-6 cigarettes  "per day   Alcohol: none   Illicit drugs:  none     OCCUPATIONAL/ENVIRONMENTAL HISTORY  Currently works as a .     FAMILY HISTORY  Mom - asthma ->COPD   Sister - asthma   JHON - Mom     RESULTS/DATA     Pulmonary Function Test Results     None on record     Chest Radiograph     XR CHEST 1 VIEW 04/03/2022  Impression  Cardiomegaly.    Mild prominence of central pulmonary vessels, which may be related to  pulmonary edema.       Chest CT Scan     CT chest: 4/3/22 -IMPRESSION:  1. Markedly technically limited examination without gross evidence of  central pulmonary embolism. Peripheral vessels not reliably assessed.  2. No separate acute detected abnormalities.    CT abdomen - 2/9/24 - IMPRESSION:  1. Constellation of imaging findings consistent with acute  uncomplicated diverticulitis at the junction of the sigmoid and  descending colon. No abnormal bowel dilatation is evident.  2. Several solid pulmonary nodules are present in the left lower  lobe, largest measuring up to 8 mm in size, unchanged to prior exam  in April 2022.  3. Multilobulated uterus with likely subserosal fibroids.       Echocardiogram     Echo: 4/4/22 - The left ventricular systolic function is normal with a 60-65% estimated ejection fraction.   2. Spectral Doppler shows an impaired relaxation pattern of left ventricular diastolic filling.   3. RVSP within normal limits. RA/ RV not well visualized     Other testing/ Labs      Eosinophils Absolute   Date Value   02/09/2024 0.28 x10*3/uL   04/03/2022 0.19 x10E9/L     No results found for: \"IGE\"    REVIEW OF SYSTEMS     REVIEW OF SYSTEMS  Review of Systems   Constitutional:  Positive for diaphoresis. Negative for fatigue and fever.   HENT:  Negative for congestion, postnasal drip, rhinorrhea, sinus pressure and voice change.    Eyes:  Negative for pain and visual disturbance.   Cardiovascular:  Negative for chest pain, palpitations and leg swelling.   Gastrointestinal:  Negative for " abdominal pain, diarrhea, nausea and vomiting.   Endocrine: Negative for cold intolerance and heat intolerance.   Musculoskeletal:  Negative for arthralgias, back pain, joint swelling and myalgias.   Skin:  Negative for rash.   Neurological:  Negative for dizziness, weakness, numbness and headaches.   Psychiatric/Behavioral:  Negative for agitation. The patient is not nervous/anxious.          PHYSICAL EXAM     VITAL SIGNS: Wt 119 kg (263 lb)   LMP  (LMP Unknown)   BMI 37.74 kg/m²      CURRENT WEIGHT: [unfilled]  BMI: [unfilled]  PREVIOUS WEIGHTS:  Wt Readings from Last 3 Encounters:   03/25/24 119 kg (263 lb)   03/11/24 120 kg (264 lb)   02/09/24 121 kg (266 lb)       Physical Exam  Vitals reviewed.   Constitutional:       General: She is not in acute distress.     Appearance: Normal appearance. She is not ill-appearing or toxic-appearing.   HENT:      Head: Normocephalic.      Nose: No rhinorrhea.   Cardiovascular:      Rate and Rhythm: Normal rate and regular rhythm.      Heart sounds: Normal heart sounds.   Pulmonary:      Effort: Pulmonary effort is normal. No respiratory distress.      Breath sounds: Normal breath sounds. No stridor. No wheezing, rhonchi or rales.   Abdominal:      General: Abdomen is flat.   Musculoskeletal:         General: Normal range of motion.      Right lower leg: No edema.      Left lower leg: No edema.   Skin:     General: Skin is warm and dry.      Nails: There is no clubbing.   Neurological:      General: No focal deficit present.      Mental Status: She is alert and oriented to person, place, and time.   Psychiatric:         Mood and Affect: Mood normal.         Behavior: Behavior normal.         Judgment: Judgment normal.         ASSESSMENT/PLAN     Pulmonary nodule: 8mm LLL - stable from 4/2022 - 2/2024   - stable for 2 years - no further follow up needed     2. Mosaic attenuation on CT chest: reviewed with Dr. Curiel   - no symptoms - no need for PFTs   - can continue as needed  albuterol     Thank you for visiting the Pulmonary clinic today!   Return to clinic - as needed   Nickie Moy CNP  My office -  [phone]- Gale is my  and Megan is my nurse.   Radiology scheduling [phone]   Appointment scheduling [phone]   Pulmonary function testing - [phone]

## 2024-03-30 PROBLEM — I50.1 ACUTE PULMONARY EDEMA WITH HEART DISEASE (MULTI): Status: ACTIVE | Noted: 2024-03-30

## 2024-03-30 PROBLEM — I50.1 ACUTE PULMONARY EDEMA WITH HEART DISEASE (MULTI): Status: RESOLVED | Noted: 2024-03-30 | Resolved: 2024-03-30

## 2024-03-30 PROBLEM — E66.812 CLASS 2 SEVERE OBESITY WITH SERIOUS COMORBIDITY AND BODY MASS INDEX (BMI) OF 37.0 TO 37.9 IN ADULT: Status: ACTIVE | Noted: 2023-06-25

## 2024-03-30 PROBLEM — Z86.2 HISTORY OF ANEMIA: Status: ACTIVE | Noted: 2024-03-30

## 2024-03-30 PROBLEM — E78.2 MIXED HYPERLIPIDEMIA: Status: ACTIVE | Noted: 2023-06-25

## 2024-03-30 PROBLEM — Q24.5 CORONARY-MYOCARDIAL BRIDGE (HHS-HCC): Status: ACTIVE | Noted: 2024-03-30

## 2024-03-30 PROBLEM — J40 BRONCHITIS: Status: RESOLVED | Noted: 2024-03-30 | Resolved: 2024-03-30

## 2024-03-30 PROBLEM — R06.02 SHORTNESS OF BREATH: Status: RESOLVED | Noted: 2024-03-30 | Resolved: 2024-03-30

## 2024-03-30 PROBLEM — I16.0 HYPERTENSIVE URGENCY: Status: RESOLVED | Noted: 2024-03-30 | Resolved: 2024-03-30

## 2024-03-30 ASSESSMENT — ENCOUNTER SYMPTOMS
NERVOUS/ANXIOUS: 0
BLURRED VISION: 0
FATIGUE: 0
WEIGHT LOSS: 0
SWEATS: 0
POLYPHAGIA: 0
TREMORS: 0
VISUAL CHANGE: 0
WEAKNESS: 0
CONFUSION: 0
POLYDIPSIA: 0
HEADACHES: 0
SEIZURES: 0
HUNGER: 0
DIZZINESS: 0
SPEECH DIFFICULTY: 0
BLACKOUTS: 0

## 2024-03-30 NOTE — ASSESSMENT & PLAN NOTE
Stable and improving  No current symptoms  Tolerating Mounjaro well  Continue current medication and management and follow-up every 6 months

## 2024-04-02 DIAGNOSIS — E11.9 TYPE 2 DIABETES MELLITUS WITHOUT COMPLICATION, WITHOUT LONG-TERM CURRENT USE OF INSULIN (MULTI): ICD-10-CM

## 2024-04-03 RX ORDER — TIRZEPATIDE 7.5 MG/.5ML
7.5 INJECTION, SOLUTION SUBCUTANEOUS
Qty: 2 ML | Refills: 11 | Status: SHIPPED | OUTPATIENT
Start: 2024-04-03 | End: 2024-04-05 | Stop reason: SDUPTHER

## 2024-04-05 DIAGNOSIS — E11.9 TYPE 2 DIABETES MELLITUS WITHOUT COMPLICATION, WITHOUT LONG-TERM CURRENT USE OF INSULIN (MULTI): ICD-10-CM

## 2024-04-07 RX ORDER — TIRZEPATIDE 7.5 MG/.5ML
7.5 INJECTION, SOLUTION SUBCUTANEOUS
Qty: 6 ML | Refills: 3 | Status: SHIPPED | OUTPATIENT
Start: 2024-04-07

## 2024-04-19 LAB — NONINV COLON CA DNA+OCC BLD SCRN STL QL: NEGATIVE

## 2024-04-29 ENCOUNTER — TELEPHONE (OUTPATIENT)
Dept: PRIMARY CARE | Facility: CLINIC | Age: 54
End: 2024-04-29
Payer: COMMERCIAL

## 2024-04-29 DIAGNOSIS — Z12.31 ENCOUNTER FOR SCREENING MAMMOGRAM FOR BREAST CANCER: Primary | ICD-10-CM

## 2024-05-22 ENCOUNTER — OFFICE VISIT (OUTPATIENT)
Dept: GASTROENTEROLOGY | Facility: CLINIC | Age: 54
End: 2024-05-22
Payer: COMMERCIAL

## 2024-05-22 ENCOUNTER — HOSPITAL ENCOUNTER (OUTPATIENT)
Dept: RADIOLOGY | Facility: HOSPITAL | Age: 54
Discharge: HOME | End: 2024-05-22
Payer: COMMERCIAL

## 2024-05-22 VITALS
HEIGHT: 70 IN | OXYGEN SATURATION: 97 % | BODY MASS INDEX: 37.71 KG/M2 | DIASTOLIC BLOOD PRESSURE: 82 MMHG | HEART RATE: 70 BPM | SYSTOLIC BLOOD PRESSURE: 128 MMHG | WEIGHT: 263.4 LBS | RESPIRATION RATE: 18 BRPM

## 2024-05-22 VITALS — WEIGHT: 259 LBS | BODY MASS INDEX: 37.08 KG/M2 | HEIGHT: 70 IN

## 2024-05-22 DIAGNOSIS — K57.32 DIVERTICULITIS OF LARGE INTESTINE WITHOUT BLEEDING, UNSPECIFIED COMPLICATION STATUS: Primary | ICD-10-CM

## 2024-05-22 DIAGNOSIS — Z12.31 ENCOUNTER FOR SCREENING MAMMOGRAM FOR BREAST CANCER: ICD-10-CM

## 2024-05-22 DIAGNOSIS — E11.9 TYPE 2 DIABETES MELLITUS WITHOUT COMPLICATION, WITHOUT LONG-TERM CURRENT USE OF INSULIN (MULTI): ICD-10-CM

## 2024-05-22 PROCEDURE — 4010F ACE/ARB THERAPY RXD/TAKEN: CPT | Performed by: STUDENT IN AN ORGANIZED HEALTH CARE EDUCATION/TRAINING PROGRAM

## 2024-05-22 PROCEDURE — 3079F DIAST BP 80-89 MM HG: CPT | Performed by: STUDENT IN AN ORGANIZED HEALTH CARE EDUCATION/TRAINING PROGRAM

## 2024-05-22 PROCEDURE — 1036F TOBACCO NON-USER: CPT | Performed by: STUDENT IN AN ORGANIZED HEALTH CARE EDUCATION/TRAINING PROGRAM

## 2024-05-22 PROCEDURE — 99204 OFFICE O/P NEW MOD 45 MIN: CPT | Performed by: STUDENT IN AN ORGANIZED HEALTH CARE EDUCATION/TRAINING PROGRAM

## 2024-05-22 PROCEDURE — 77067 SCR MAMMO BI INCL CAD: CPT

## 2024-05-22 PROCEDURE — 77067 SCR MAMMO BI INCL CAD: CPT | Performed by: STUDENT IN AN ORGANIZED HEALTH CARE EDUCATION/TRAINING PROGRAM

## 2024-05-22 PROCEDURE — 77063 BREAST TOMOSYNTHESIS BI: CPT | Performed by: STUDENT IN AN ORGANIZED HEALTH CARE EDUCATION/TRAINING PROGRAM

## 2024-05-22 PROCEDURE — 3008F BODY MASS INDEX DOCD: CPT | Performed by: STUDENT IN AN ORGANIZED HEALTH CARE EDUCATION/TRAINING PROGRAM

## 2024-05-22 PROCEDURE — 3074F SYST BP LT 130 MM HG: CPT | Performed by: STUDENT IN AN ORGANIZED HEALTH CARE EDUCATION/TRAINING PROGRAM

## 2024-05-22 NOTE — PROGRESS NOTES
Subjective     History of Present Illness:   Ana Martel is a 54 y.o. female with hx of T2DM on Mounjaro who presents for follow up of diverticulitis.     She presented to the ED in 02/09/2024 that showed thickening at the junction of the sigmoid and descending colon.    She has constipation with hard to pass stool. She takes fiber but does not take any laxatives.     She feels well with no abdominal pain, hematochezia or weight loss.       Past Medical History   has a past medical history of Acute pulmonary edema with heart disease (Multi) (03/30/2024), Bright red blood per rectum (06/25/2023), Bronchitis (03/30/2024), Contact with and (suspected) exposure to covid-19 (11/13/2020), COVID-19 (06/25/2023), Dry eyes (06/25/2023), Heavy menses (06/25/2023), Hypertensive urgency (03/30/2024), Other general symptoms and signs (11/13/2020), Personal history of diseases of the blood and blood-forming organs and certain disorders involving the immune mechanism, and Shortness of breath (03/30/2024).     Social History   reports that she has quit smoking. Her smoking use included cigarettes. She has never used smokeless tobacco. She reports current alcohol use. She reports that she does not use drugs.     Family History  family history includes Breast cancer (age of onset: 39) in her sister; Coronary artery disease in her father; Diabetes type II in her mother; Hyperlipidemia in her father and mother; Hypertension in her father and mother; Thalassemia in her daughter, mother, and sister.     Allergies  Allergies   Allergen Reactions    Pineapple Hives    Scallops Unknown    Shellfish Containing Products Hives       Medications  Current Outpatient Medications   Medication Instructions    atorvastatin (LIPITOR) 20 mg, oral, Daily    BABY ASPIRIN ORAL 1 tablet, oral, Daily    blood-glucose sensor (Dexcom G7 Sensor) device 1 each, subcutaneous, Daily    magnesium oxide 500 mg magnesium tablet 1 tablet, oral, Daily    metFORMIN  (GLUCOPHAGE) 1,000 mg, oral, 2 times daily (morning and late afternoon)    methocarbamol (ROBAXIN) 500 mg, oral, 2 times daily    metoprolol succinate XL (TOPROL-XL) 25 mg, oral, Daily    Mounjaro 7.5 mg, subcutaneous, Every 7 days    multivitamin tablet 1 tablet, oral, Daily    triamterene-hydrochlorothiazid (Maxzide-25) 37.5-25 mg tablet TAKE 1 TABLET DAILY    valACYclovir (Valtrex) 1 gram tablet     valsartan (DIOVAN) 160 mg, oral, Daily        Objective   Visit Vitals  /82 (BP Location: Left arm, Patient Position: Sitting, BP Cuff Size: Large adult)   Pulse 70   Resp 18      Physical Exam  Constitutional:       General: She is not in acute distress.     Appearance: Normal appearance.   HENT:      Head: Normocephalic and atraumatic.      Mouth/Throat:      Mouth: Mucous membranes are moist.   Eyes:      Extraocular Movements: Extraocular movements intact.   Pulmonary:      Effort: Pulmonary effort is normal.      Breath sounds: No stridor. No wheezing.   Abdominal:      General: Abdomen is flat. There is no distension.   Musculoskeletal:         General: Normal range of motion.   Skin:     General: Skin is warm and dry.   Neurological:      General: No focal deficit present.      Mental Status: She is alert.   Psychiatric:         Mood and Affect: Mood normal.         Judgment: Judgment normal.         Assessment/Plan   Ana Martel is a 54 y.o. female who presents to GI clinic with hx of T2DM on Mounjaro who presents to clinic for follow up of acute uncomplicated diverticulitis of the sigmoid colon. Recommend colonoscopy with a two day prep given constipation. Recommend fiber supplementation and miralax daily in the interim.    Discussed benefits and outlined risks including infection, bleeding and perforation. She is agreeable to proceed.    Problem List Items Addressed This Visit       Type 2 diabetes mellitus without complication, without long-term current use of insulin (Multi)     Other Visit  Diagnoses       Diverticulitis of large intestine without bleeding, unspecified complication status    -  Primary                   Jocelyn Alvarez MD         My final recommendations will be communicated back to the requesting physician by way of shared Medical record or letter to requesting physician via fax.

## 2024-06-12 DIAGNOSIS — E11.9 TYPE 2 DIABETES MELLITUS WITHOUT COMPLICATION, WITHOUT LONG-TERM CURRENT USE OF INSULIN (MULTI): ICD-10-CM

## 2024-06-12 NOTE — TELEPHONE ENCOUNTER
Pt called in requesting 3 month refill of:     tirzepatide (Mounjaro) 7.5 mg/0.5 mL pen injector       Send to :           [facility] - [place], [place] - [address] [place] [address]  Phone: [phone] Fax: [phone]      LV:  3/11/24  NV:  None Sched

## 2024-06-13 RX ORDER — TIRZEPATIDE 7.5 MG/.5ML
7.5 INJECTION, SOLUTION SUBCUTANEOUS
Qty: 6 ML | Refills: 3 | Status: SHIPPED | OUTPATIENT
Start: 2024-06-13

## 2024-07-01 DIAGNOSIS — Z12.11 COLON CANCER SCREENING: ICD-10-CM

## 2024-07-02 RX ORDER — SODIUM, POTASSIUM,MAG SULFATES 17.5-3.13G
1 SOLUTION, RECONSTITUTED, ORAL ORAL 2 TIMES DAILY
Qty: 1 EACH | Refills: 0 | Status: SHIPPED | OUTPATIENT
Start: 2024-07-02 | End: 2024-07-03

## 2024-07-12 ENCOUNTER — HOSPITAL ENCOUNTER (OUTPATIENT)
Dept: GASTROENTEROLOGY | Facility: HOSPITAL | Age: 54
Setting detail: OUTPATIENT SURGERY
Discharge: HOME | End: 2024-07-12
Payer: COMMERCIAL

## 2024-07-12 ENCOUNTER — ANESTHESIA EVENT (OUTPATIENT)
Dept: GASTROENTEROLOGY | Facility: HOSPITAL | Age: 54
End: 2024-07-12
Payer: COMMERCIAL

## 2024-07-12 ENCOUNTER — ANESTHESIA (OUTPATIENT)
Dept: GASTROENTEROLOGY | Facility: HOSPITAL | Age: 54
End: 2024-07-12
Payer: COMMERCIAL

## 2024-07-12 VITALS
OXYGEN SATURATION: 99 % | BODY MASS INDEX: 37.37 KG/M2 | HEART RATE: 63 BPM | DIASTOLIC BLOOD PRESSURE: 69 MMHG | RESPIRATION RATE: 18 BRPM | WEIGHT: 261 LBS | SYSTOLIC BLOOD PRESSURE: 118 MMHG | TEMPERATURE: 96.8 F | HEIGHT: 70 IN

## 2024-07-12 DIAGNOSIS — K57.32 DIVERTICULITIS OF LARGE INTESTINE, UNSPECIFIED BLEEDING STATUS, UNSPECIFIED COMPLICATION STATUS: ICD-10-CM

## 2024-07-12 LAB
GLUCOSE BLD MANUAL STRIP-MCNC: 93 MG/DL (ref 74–99)
HCG UR QL IA.RAPID: NEGATIVE

## 2024-07-12 PROCEDURE — 2500000004 HC RX 250 GENERAL PHARMACY W/ HCPCS (ALT 636 FOR OP/ED): Performed by: NURSE ANESTHETIST, CERTIFIED REGISTERED

## 2024-07-12 PROCEDURE — 82947 ASSAY GLUCOSE BLOOD QUANT: CPT

## 2024-07-12 PROCEDURE — 45385 COLONOSCOPY W/LESION REMOVAL: CPT | Performed by: STUDENT IN AN ORGANIZED HEALTH CARE EDUCATION/TRAINING PROGRAM

## 2024-07-12 PROCEDURE — 81025 URINE PREGNANCY TEST: CPT | Performed by: STUDENT IN AN ORGANIZED HEALTH CARE EDUCATION/TRAINING PROGRAM

## 2024-07-12 RX ORDER — PROPOFOL 10 MG/ML
INJECTION, EMULSION INTRAVENOUS AS NEEDED
Status: DISCONTINUED | OUTPATIENT
Start: 2024-07-12 | End: 2024-07-12

## 2024-07-12 SDOH — HEALTH STABILITY: MENTAL HEALTH: CURRENT SMOKER: 0

## 2024-07-12 ASSESSMENT — COLUMBIA-SUICIDE SEVERITY RATING SCALE - C-SSRS
1. IN THE PAST MONTH, HAVE YOU WISHED YOU WERE DEAD OR WISHED YOU COULD GO TO SLEEP AND NOT WAKE UP?: NO
2. HAVE YOU ACTUALLY HAD ANY THOUGHTS OF KILLING YOURSELF?: NO
6. HAVE YOU EVER DONE ANYTHING, STARTED TO DO ANYTHING, OR PREPARED TO DO ANYTHING TO END YOUR LIFE?: NO

## 2024-07-12 ASSESSMENT — PAIN SCALES - GENERAL
PAIN_LEVEL: 0
PAINLEVEL_OUTOF10: 0 - NO PAIN

## 2024-07-12 ASSESSMENT — PAIN - FUNCTIONAL ASSESSMENT
PAIN_FUNCTIONAL_ASSESSMENT: 0-10

## 2024-07-12 NOTE — ANESTHESIA POSTPROCEDURE EVALUATION
Patient: Ana Martel    Procedure Summary       Date: 07/12/24 Room / Location: [facility]    Anesthesia Start: 0835 Anesthesia Stop: 0904    Procedure: COLONOSCOPY Diagnosis: Diverticulitis of large intestine, unspecified bleeding status, unspecified complication status    Scheduled Providers: Jocelyn Alvarez MD Responsible Provider: Dhaval Mcmanus MD    Anesthesia Type: general ASA Status: 2            Anesthesia Type: general    Vitals Value Taken Time   BP 89/49 07/12/24 0906   Temp 36.2 07/12/24 0906   Pulse 68 07/12/24 0906   Resp 10 07/12/24 0906   SpO2 96 07/12/24 0906       Anesthesia Post Evaluation    Patient location during evaluation: PACU  Patient participation: complete - patient participated  Level of consciousness: awake and alert  Pain score: 0  Pain management: adequate  Airway patency: patent  Cardiovascular status: acceptable  Respiratory status: acceptable  Hydration status: acceptable  Postoperative Nausea and Vomiting: none        There were no known notable events for this encounter.

## 2024-07-12 NOTE — H&P
Procedure H&P    Patient Profile-Procedures  Name Ana Martel  Date of Birth 1970  MRN [id number]  Address   [address] [place], [place] [id number] [place] [address]    Primary Phone Number [phone]  Secondary Phone Number    Sully Bull    Procedure(s):  Procedures: Colonoscopy  Primary contact name and number   Extended Emergency Contact Information  Primary Emergency Contact: Federico Martel  Address: [address] [place] [address]  Home Phone: [phone]  Relation: Spouse    General Health  Weight   Vitals:    07/12/24 0708   Weight: 118 kg (261 lb)     BMI Body mass index is 37.45 kg/m².    Allergies  Allergies   Allergen Reactions    Pineapple Hives    Scallops Unknown    Shellfish Containing Products Hives       Past Medical History   Past Medical History:   Diagnosis Date    Acute pulmonary edema with heart disease (Multi) 03/30/2024    Bright red blood per rectum 06/25/2023    Bronchitis 03/30/2024    Contact with and (suspected) exposure to covid-19 11/13/2020    Close exposure to COVID-19 virus    COVID-19 06/25/2023    Dry eyes 06/25/2023    Heavy menses 06/25/2023    Hypertensive urgency 03/30/2024    Other general symptoms and signs 11/13/2020    Flu-like symptoms    Personal history of diseases of the blood and blood-forming organs and certain disorders involving the immune mechanism     History of thalassemia    Shortness of breath 03/30/2024    Comment on above: SOB       Provider assessment  Diagnosis:  hx of diverticulitis and Colon Cancer Screening/Surveillance   Medication Reviewed - yes  Prior to Admission medications    Medication Sig Start Date End Date Taking? Authorizing Provider   atorvastatin (Lipitor) 20 mg tablet TAKE 1 TABLET DAILY 1/12/24  Yes Sully Hill MD   BABY ASPIRIN ORAL Take 1 tablet by mouth once daily.   Yes Historical Provider, MD   magnesium oxide 500 mg magnesium tablet Take 1 tablet (500 mg) by  mouth once daily.   Yes Historical Provider, MD   metFORMIN (Glucophage) 1,000 mg tablet Take 1 tablet (1,000 mg) by mouth 2 times a day with meals. 9/20/23 9/19/24 Yes Sully Hill MD   metoprolol succinate XL (Toprol-XL) 25 mg 24 hr tablet Take 1 tablet (25 mg) by mouth once daily. 10/14/23  Yes Sully Hill MD   multivitamin tablet Take 1 tablet by mouth once daily.   Yes Historical Provider, MD   triamterene-hydrochlorothiazid (Maxzide-25) 37.5-25 mg tablet TAKE 1 TABLET DAILY 10/23/23  Yes Sully Hill MD   valsartan (Diovan) 160 mg tablet Take 1 tablet (160 mg) by mouth once daily. 12/14/23 12/13/24 Yes Sully Hill MD   blood-glucose sensor (Dexcom G7 Sensor) device Inject 1 each under the skin early in the morning.. 10/23/23   Sully Hill MD   methocarbamol (Robaxin) 500 mg tablet Take 1 tablet (500 mg) by mouth 2 times a day for 10 days. 6/12/23 3/25/24  Sully Hill MD   tirzepatide (Mounjaro) 7.5 mg/0.5 mL pen injector Inject 7.5 mg under the skin every 7 days. 6/13/24   Sully Hill MD   valACYclovir (Valtrex) 1 gram tablet     Historical Provider, MD       Physical Exam  Vitals:    07/12/24 0708   BP: 117/65   Pulse: 72   Resp: 16   Temp: 36.4 °C (97.5 °F)   SpO2: 99%        General: A&Ox3, NAD.  HEENT: AT/NC.   CV: RRR. No murmur.  Resp: CTA bilaterally. No wheezing, rhonchi or rales.   GI: Soft, NT/ND. BSx4.  Extrem: No edema. Pulses intact.  Neuro: No focal deficits.   Psych: Normal mood and affect.      Procedure Plan - pre-procedural (re)assesment completed by physician:  discharge/transfer patient when discharge criteria met    Jocelyn Alvarez MD  7/12/2024 8:23 AM

## 2024-07-12 NOTE — ANESTHESIA PREPROCEDURE EVALUATION
Patient: Ana Martel    Procedure Information       Date/Time: 07/12/24 0830    Scheduled providers: Jocelyn Alvarez MD    Procedure: COLONOSCOPY    Location: [facility] [place]            Relevant Problems   Cardiac   (+) Coronary-myocardial bridge (HHS-HCC)   (+) Mixed hyperlipidemia   (+) Primary hypertension      Endocrine   (+) Acquired hypothyroidism   (+) Class 2 severe obesity with serious comorbidity and body mass index (BMI) of 37.0 to 37.9 in adult (Multi)   (+) Type 2 diabetes mellitus without complication, without long-term current use of insulin (Multi)      HEENT   (+) Seasonal allergies      ID   (+) Cold sore       Clinical information reviewed:   Tobacco  Allergies  Meds   Med Hx  Surg Hx  OB Status  Fam Hx  Soc   Hx        NPO Detail:  NPO/Void Status  Date of Last Liquid: 07/12/24  Time of Last Liquid: 0530  Date of Last Solid: 07/11/24  Time of Last Solid: 0700  Last Intake Type: Clear fluids  Time of Last Void: 0700         Physical Exam    Airway  Mallampati: II  TM distance: >3 FB  Neck ROM: full     Cardiovascular   Rhythm: regular  Rate: normal     Dental - normal exam     Pulmonary   Breath sounds clear to auscultation     Abdominal            Anesthesia Plan    History of general anesthesia?: yes  History of complications of general anesthesia?: no    ASA 2     general     The patient is not a current smoker.    intravenous induction   Anesthetic plan and risks discussed with patient.    Plan discussed with CAA.

## 2024-07-12 NOTE — H&P
Procedure H&P    Patient Profile-Procedures  Name Ana Martel  Date of Birth 1970  MRN [id number]  Address   [address] [place], [place] [id number] [place] [address]    Primary Phone Number [phone]  Secondary Phone Number    Sully Bull    Procedure(s):  Procedures: Colonoscopy  Primary contact name and number   Extended Emergency Contact Information  Primary Emergency Contact: Federico Martel  Address: [address] [place] [address]  Home Phone: [phone]  Relation: Spouse    General Health  Weight   Vitals:    07/12/24 0708   Weight: 118 kg (261 lb)     BMI Body mass index is 37.45 kg/m².    Allergies  Allergies   Allergen Reactions    Pineapple Hives    Scallops Unknown    Shellfish Containing Products Hives       Past Medical History   Past Medical History:   Diagnosis Date    Acute pulmonary edema with heart disease (Multi) 03/30/2024    Bright red blood per rectum 06/25/2023    Bronchitis 03/30/2024    Contact with and (suspected) exposure to covid-19 11/13/2020    Close exposure to COVID-19 virus    COVID-19 06/25/2023    Dry eyes 06/25/2023    Heavy menses 06/25/2023    Hypertensive urgency 03/30/2024    Other general symptoms and signs 11/13/2020    Flu-like symptoms    Personal history of diseases of the blood and blood-forming organs and certain disorders involving the immune mechanism     History of thalassemia    Shortness of breath 03/30/2024    Comment on above: SOB       Provider assessment  Diagnosis: Colon Cancer Screening/Surveillance  and diverticulitis   Medication Reviewed - yes  Prior to Admission medications    Medication Sig Start Date End Date Taking? Authorizing Provider   atorvastatin (Lipitor) 20 mg tablet TAKE 1 TABLET DAILY 1/12/24  Yes Sully Hill MD   BABY ASPIRIN ORAL Take 1 tablet by mouth once daily.   Yes Historical Provider, MD   magnesium oxide 500 mg magnesium tablet Take 1 tablet (500 mg) by mouth  once daily.   Yes Historical Provider, MD   metFORMIN (Glucophage) 1,000 mg tablet Take 1 tablet (1,000 mg) by mouth 2 times a day with meals. 9/20/23 9/19/24 Yes Sully Hill MD   metoprolol succinate XL (Toprol-XL) 25 mg 24 hr tablet Take 1 tablet (25 mg) by mouth once daily. 10/14/23  Yes Sully Hill MD   multivitamin tablet Take 1 tablet by mouth once daily.   Yes Historical Provider, MD   triamterene-hydrochlorothiazid (Maxzide-25) 37.5-25 mg tablet TAKE 1 TABLET DAILY 10/23/23  Yes Sully Hill MD   valsartan (Diovan) 160 mg tablet Take 1 tablet (160 mg) by mouth once daily. 12/14/23 12/13/24 Yes Sully Hill MD   blood-glucose sensor (Dexcom G7 Sensor) device Inject 1 each under the skin early in the morning.. 10/23/23   Sully Hill MD   methocarbamol (Robaxin) 500 mg tablet Take 1 tablet (500 mg) by mouth 2 times a day for 10 days. 6/12/23 3/25/24  Sully Hill MD   tirzepatide (Mounjaro) 7.5 mg/0.5 mL pen injector Inject 7.5 mg under the skin every 7 days. 6/13/24   Sully Hill MD   valACYclovir (Valtrex) 1 gram tablet     Historical Provider, MD       Physical Exam  Vitals:    07/12/24 0708   BP: 117/65   Pulse: 72   Resp: 16   Temp: 36.4 °C (97.5 °F)   SpO2: 99%        General: A&Ox3, NAD.  HEENT: AT/NC.   CV: RRR. No murmur.  Resp: CTA bilaterally. No wheezing, rhonchi or rales.   GI: Soft, NT/ND. BSx4.  Extrem: No edema. Pulses intact.  Neuro: No focal deficits.   Psych: Normal mood and affect.      Procedure Plan - pre-procedural (re)assesment completed by physician:  discharge/transfer patient when discharge criteria met    Jocelyn Alvarez MD  7/12/2024 8:26 AM

## 2024-07-23 LAB
LABORATORY COMMENT REPORT: NORMAL
PATH REPORT.FINAL DX SPEC: NORMAL
PATH REPORT.GROSS SPEC: NORMAL
PATH REPORT.RELEVANT HX SPEC: NORMAL
PATH REPORT.TOTAL CANCER: NORMAL

## 2024-07-31 ENCOUNTER — APPOINTMENT (OUTPATIENT)
Dept: PRIMARY CARE | Facility: CLINIC | Age: 54
End: 2024-07-31
Payer: COMMERCIAL

## 2024-07-31 VITALS
DIASTOLIC BLOOD PRESSURE: 75 MMHG | HEART RATE: 64 BPM | BODY MASS INDEX: 39.03 KG/M2 | OXYGEN SATURATION: 93 % | WEIGHT: 272 LBS | TEMPERATURE: 97.1 F | SYSTOLIC BLOOD PRESSURE: 125 MMHG

## 2024-07-31 DIAGNOSIS — E66.01 CLASS 2 SEVERE OBESITY WITH SERIOUS COMORBIDITY AND BODY MASS INDEX (BMI) OF 39.0 TO 39.9 IN ADULT, UNSPECIFIED OBESITY TYPE (MULTI): ICD-10-CM

## 2024-07-31 DIAGNOSIS — Z00.00 ANNUAL PHYSICAL EXAM: Primary | ICD-10-CM

## 2024-07-31 DIAGNOSIS — E11.9 TYPE 2 DIABETES MELLITUS WITHOUT COMPLICATION, WITHOUT LONG-TERM CURRENT USE OF INSULIN (MULTI): ICD-10-CM

## 2024-07-31 DIAGNOSIS — E03.9 ACQUIRED HYPOTHYROIDISM: ICD-10-CM

## 2024-07-31 LAB
POC ALBUMIN /CREATININE RATIO MANUALLY ENTERED: <30 UG/MG CREAT
POC URINE ALBUMIN: 30 MG/L
POC URINE CREATININE: 200 MG/DL

## 2024-07-31 PROCEDURE — 3074F SYST BP LT 130 MM HG: CPT | Performed by: FAMILY MEDICINE

## 2024-07-31 PROCEDURE — 3078F DIAST BP <80 MM HG: CPT | Performed by: FAMILY MEDICINE

## 2024-07-31 PROCEDURE — 3048F LDL-C <100 MG/DL: CPT | Performed by: FAMILY MEDICINE

## 2024-07-31 PROCEDURE — 99396 PREV VISIT EST AGE 40-64: CPT | Performed by: FAMILY MEDICINE

## 2024-07-31 PROCEDURE — 3044F HG A1C LEVEL LT 7.0%: CPT | Performed by: FAMILY MEDICINE

## 2024-07-31 PROCEDURE — 4010F ACE/ARB THERAPY RXD/TAKEN: CPT | Performed by: FAMILY MEDICINE

## 2024-07-31 PROCEDURE — 82044 UR ALBUMIN SEMIQUANTITATIVE: CPT | Performed by: FAMILY MEDICINE

## 2024-07-31 PROCEDURE — 1036F TOBACCO NON-USER: CPT | Performed by: FAMILY MEDICINE

## 2024-07-31 RX ORDER — TIRZEPATIDE 10 MG/.5ML
10 INJECTION, SOLUTION SUBCUTANEOUS
Qty: 6 ML | Refills: 3 | Status: SHIPPED | OUTPATIENT
Start: 2024-08-04 | End: 2025-08-04

## 2024-07-31 ASSESSMENT — PATIENT HEALTH QUESTIONNAIRE - PHQ9
1. LITTLE INTEREST OR PLEASURE IN DOING THINGS: NOT AT ALL
2. FEELING DOWN, DEPRESSED OR HOPELESS: NOT AT ALL
SUM OF ALL RESPONSES TO PHQ9 QUESTIONS 1 AND 2: 0

## 2024-08-01 ENCOUNTER — TELEPHONE (OUTPATIENT)
Dept: PRIMARY CARE | Facility: CLINIC | Age: 54
End: 2024-08-01

## 2024-08-01 ENCOUNTER — LAB (OUTPATIENT)
Dept: LAB | Facility: LAB | Age: 54
End: 2024-08-01
Payer: COMMERCIAL

## 2024-08-01 DIAGNOSIS — E11.9 TYPE 2 DIABETES MELLITUS WITHOUT COMPLICATION, WITHOUT LONG-TERM CURRENT USE OF INSULIN (MULTI): ICD-10-CM

## 2024-08-01 DIAGNOSIS — Z00.00 ANNUAL PHYSICAL EXAM: ICD-10-CM

## 2024-08-01 LAB
ALBUMIN SERPL BCP-MCNC: 4.3 G/DL (ref 3.4–5)
ALP SERPL-CCNC: 41 U/L (ref 33–110)
ALT SERPL W P-5'-P-CCNC: 15 U/L (ref 7–45)
ANION GAP SERPL CALC-SCNC: 12 MMOL/L (ref 10–20)
AST SERPL W P-5'-P-CCNC: 14 U/L (ref 9–39)
BILIRUB SERPL-MCNC: 0.5 MG/DL (ref 0–1.2)
BUN SERPL-MCNC: 15 MG/DL (ref 6–23)
CALCIUM SERPL-MCNC: 9.9 MG/DL (ref 8.6–10.6)
CHLORIDE SERPL-SCNC: 101 MMOL/L (ref 98–107)
CHOLEST SERPL-MCNC: 114 MG/DL (ref 0–199)
CHOLESTEROL/HDL RATIO: 3
CO2 SERPL-SCNC: 30 MMOL/L (ref 21–32)
CREAT SERPL-MCNC: 0.58 MG/DL (ref 0.5–1.05)
EGFRCR SERPLBLD CKD-EPI 2021: >90 ML/MIN/1.73M*2
EST. AVERAGE GLUCOSE BLD GHB EST-MCNC: 123 MG/DL
GLUCOSE SERPL-MCNC: 98 MG/DL (ref 74–99)
HBA1C MFR BLD: 5.9 %
HCV AB SER QL: NONREACTIVE
HDLC SERPL-MCNC: 38.5 MG/DL
LDLC SERPL CALC-MCNC: 57 MG/DL
NON HDL CHOLESTEROL: 76 MG/DL (ref 0–149)
POTASSIUM SERPL-SCNC: 4.9 MMOL/L (ref 3.5–5.3)
PROT SERPL-MCNC: 6.9 G/DL (ref 6.4–8.2)
SODIUM SERPL-SCNC: 138 MMOL/L (ref 136–145)
TRIGL SERPL-MCNC: 95 MG/DL (ref 0–149)
TSH SERPL-ACNC: 4.28 MIU/L (ref 0.44–3.98)
VLDL: 19 MG/DL (ref 0–40)

## 2024-08-01 PROCEDURE — 86803 HEPATITIS C AB TEST: CPT

## 2024-08-01 PROCEDURE — 83036 HEMOGLOBIN GLYCOSYLATED A1C: CPT

## 2024-08-01 PROCEDURE — 36415 COLL VENOUS BLD VENIPUNCTURE: CPT

## 2024-08-01 PROCEDURE — 80053 COMPREHEN METABOLIC PANEL: CPT

## 2024-08-01 PROCEDURE — 84443 ASSAY THYROID STIM HORMONE: CPT

## 2024-08-01 PROCEDURE — 80061 LIPID PANEL: CPT

## 2024-08-04 NOTE — PATIENT INSTRUCTIONS
Today you had your annual checkup we will check blood work to determine the cause of your symptoms I suspect your thyroid might be underactive  Given the fact that you are on Mounjaro and this is potentially happening I would like you to schedule an ultrasound of your thyroid just to make sure everything is okay

## 2024-08-04 NOTE — ASSESSMENT & PLAN NOTE
Otherwise healthy 54-year-old with multiple medical issues  Chest biometric screening  Continue healthy diet and exercise  Continue annual exams  Recommend two-part zoster

## 2024-08-09 ENCOUNTER — HOSPITAL ENCOUNTER (OUTPATIENT)
Dept: RADIOLOGY | Facility: HOSPITAL | Age: 54
Discharge: HOME | End: 2024-08-09
Payer: COMMERCIAL

## 2024-08-09 DIAGNOSIS — E03.9 ACQUIRED HYPOTHYROIDISM: ICD-10-CM

## 2024-08-09 PROCEDURE — 76536 US EXAM OF HEAD AND NECK: CPT

## 2024-08-10 DIAGNOSIS — E04.2 MULTIPLE THYROID NODULES: Primary | ICD-10-CM

## 2024-08-23 DIAGNOSIS — E11.9 TYPE 2 DIABETES MELLITUS WITHOUT COMPLICATION, UNSPECIFIED WHETHER LONG TERM INSULIN USE (MULTI): ICD-10-CM

## 2024-08-23 RX ORDER — METFORMIN HYDROCHLORIDE 1000 MG/1
1000 TABLET ORAL
Qty: 180 TABLET | Refills: 3 | Status: SHIPPED | OUTPATIENT
Start: 2024-08-23

## 2024-09-16 DIAGNOSIS — I10 PRIMARY HYPERTENSION: ICD-10-CM

## 2024-09-16 RX ORDER — METOPROLOL SUCCINATE 25 MG/1
25 TABLET, EXTENDED RELEASE ORAL DAILY
Qty: 90 TABLET | Refills: 3 | Status: SHIPPED | OUTPATIENT
Start: 2024-09-16

## 2024-10-17 DIAGNOSIS — I10 HYPERTENSION, UNSPECIFIED TYPE: ICD-10-CM

## 2024-10-17 RX ORDER — TRIAMTERENE/HYDROCHLOROTHIAZID 37.5-25 MG
TABLET ORAL
Qty: 90 TABLET | Refills: 3 | Status: SHIPPED | OUTPATIENT
Start: 2024-10-17

## 2024-11-14 DIAGNOSIS — E11.9 TYPE 2 DIABETES MELLITUS WITHOUT COMPLICATION, WITHOUT LONG-TERM CURRENT USE OF INSULIN (MULTI): Primary | ICD-10-CM

## 2024-11-14 RX ORDER — BLOOD-GLUCOSE SENSOR
EACH MISCELLANEOUS
Qty: 4 EACH | Refills: 3 | Status: SHIPPED | OUTPATIENT
Start: 2024-11-14

## 2024-11-20 DIAGNOSIS — E11.9 TYPE 2 DIABETES MELLITUS WITHOUT COMPLICATION, WITHOUT LONG-TERM CURRENT USE OF INSULIN (MULTI): ICD-10-CM

## 2024-11-20 RX ORDER — BLOOD-GLUCOSE SENSOR
EACH MISCELLANEOUS
Qty: 4 EACH | Refills: 3 | Status: SHIPPED | OUTPATIENT
Start: 2024-11-20

## 2024-11-21 DIAGNOSIS — I15.9 SECONDARY HYPERTENSION: ICD-10-CM

## 2024-11-21 RX ORDER — VALSARTAN 160 MG/1
160 TABLET ORAL DAILY
Qty: 90 TABLET | Refills: 3 | Status: SHIPPED | OUTPATIENT
Start: 2024-11-21

## 2024-12-02 DIAGNOSIS — E11.9 TYPE 2 DIABETES MELLITUS WITHOUT COMPLICATION, WITHOUT LONG-TERM CURRENT USE OF INSULIN (MULTI): ICD-10-CM

## 2024-12-02 NOTE — TELEPHONE ENCOUNTER
[id number] number   Pharm called wanting to know the directions for patient dexcom should be every 10 days not every 7   [id number] reference number

## 2024-12-04 RX ORDER — BLOOD-GLUCOSE SENSOR
EACH MISCELLANEOUS
Qty: 4 EACH | Refills: 3 | Status: SHIPPED | OUTPATIENT
Start: 2024-12-04

## 2024-12-04 NOTE — TELEPHONE ENCOUNTER
"They need in the pharm note \"must change every 7 days\" so that they dont cancel the script. They need a new script sent   "

## 2025-01-06 DIAGNOSIS — E78.5 HYPERLIPIDEMIA, UNSPECIFIED HYPERLIPIDEMIA TYPE: ICD-10-CM

## 2025-01-06 RX ORDER — ATORVASTATIN CALCIUM 20 MG/1
20 TABLET, FILM COATED ORAL DAILY
Qty: 90 TABLET | Refills: 3 | Status: SHIPPED | OUTPATIENT
Start: 2025-01-06

## 2025-02-25 DIAGNOSIS — E11.65 TYPE 2 DIABETES MELLITUS WITH HYPERGLYCEMIA, WITHOUT LONG-TERM CURRENT USE OF INSULIN: ICD-10-CM

## 2025-02-25 RX ORDER — TIRZEPATIDE 12.5 MG/.5ML
12.5 INJECTION, SOLUTION SUBCUTANEOUS WEEKLY
Qty: 6 ML | Refills: 3 | Status: SHIPPED | OUTPATIENT
Start: 2025-02-25

## 2025-04-25 DIAGNOSIS — Z12.31 ENCOUNTER FOR SCREENING MAMMOGRAM FOR BREAST CANCER: ICD-10-CM

## 2025-04-25 DIAGNOSIS — Z00.00 ANNUAL PHYSICAL EXAM: Primary | ICD-10-CM

## 2025-05-06 LAB
ALBUMIN SERPL-MCNC: 4.4 G/DL (ref 3.6–5.1)
ALP SERPL-CCNC: 35 U/L (ref 37–153)
ALT SERPL-CCNC: 14 U/L (ref 6–29)
ANION GAP SERPL CALCULATED.4IONS-SCNC: 9 MMOL/L (CALC) (ref 7–17)
AST SERPL-CCNC: 22 U/L (ref 10–35)
BILIRUB SERPL-MCNC: 0.5 MG/DL (ref 0.2–1.2)
BUN SERPL-MCNC: 14 MG/DL (ref 7–25)
CALCIUM SERPL-MCNC: 9.6 MG/DL (ref 8.6–10.4)
CHLORIDE SERPL-SCNC: 103 MMOL/L (ref 98–110)
CHOLEST SERPL-MCNC: 133 MG/DL
CHOLEST/HDLC SERPL: 3.3 (CALC)
CO2 SERPL-SCNC: 28 MMOL/L (ref 20–32)
CREAT SERPL-MCNC: 0.54 MG/DL (ref 0.5–1.03)
EGFRCR SERPLBLD CKD-EPI 2021: 109 ML/MIN/1.73M2
EST. AVERAGE GLUCOSE BLD GHB EST-MCNC: 126 MG/DL
EST. AVERAGE GLUCOSE BLD GHB EST-SCNC: 7 MMOL/L
GLUCOSE SERPL-MCNC: 85 MG/DL (ref 65–139)
HBA1C MFR BLD: 6 %
HDLC SERPL-MCNC: 40 MG/DL
LDLC SERPL CALC-MCNC: 72 MG/DL (CALC)
NONHDLC SERPL-MCNC: 93 MG/DL (CALC)
POTASSIUM SERPL-SCNC: 4.4 MMOL/L (ref 3.5–5.3)
PROT SERPL-MCNC: 7.2 G/DL (ref 6.1–8.1)
SODIUM SERPL-SCNC: 140 MMOL/L (ref 135–146)
TRIGL SERPL-MCNC: 124 MG/DL
TSH SERPL-ACNC: 2.78 MIU/L

## 2025-05-12 ENCOUNTER — APPOINTMENT (OUTPATIENT)
Dept: PRIMARY CARE | Facility: CLINIC | Age: 55
End: 2025-05-12
Payer: COMMERCIAL

## 2025-05-12 VITALS
TEMPERATURE: 97.5 F | HEART RATE: 65 BPM | WEIGHT: 278.6 LBS | SYSTOLIC BLOOD PRESSURE: 119 MMHG | OXYGEN SATURATION: 96 % | DIASTOLIC BLOOD PRESSURE: 76 MMHG | BODY MASS INDEX: 39.97 KG/M2

## 2025-05-12 DIAGNOSIS — E11.65 TYPE 2 DIABETES MELLITUS WITH HYPERGLYCEMIA, WITHOUT LONG-TERM CURRENT USE OF INSULIN: ICD-10-CM

## 2025-05-12 DIAGNOSIS — N93.9 ABNORMAL VAGINAL BLEEDING: Primary | ICD-10-CM

## 2025-05-12 PROCEDURE — 99214 OFFICE O/P EST MOD 30 MIN: CPT | Performed by: FAMILY MEDICINE

## 2025-05-12 PROCEDURE — 1036F TOBACCO NON-USER: CPT | Performed by: FAMILY MEDICINE

## 2025-05-12 PROCEDURE — 3074F SYST BP LT 130 MM HG: CPT | Performed by: FAMILY MEDICINE

## 2025-05-12 PROCEDURE — 4010F ACE/ARB THERAPY RXD/TAKEN: CPT | Performed by: FAMILY MEDICINE

## 2025-05-12 PROCEDURE — 3078F DIAST BP <80 MM HG: CPT | Performed by: FAMILY MEDICINE

## 2025-05-12 NOTE — PROGRESS NOTES
Subjective   Patient ID: Ana Martel is a 55 y.o. female who presents for Diabetes (Diabetes/Of note- has been getting heavy menses).  Follow-up diabetes  Has had abnormal vaginal bleeding had been postmenopausal has started having.  Intermittently heavy bleeding  Tolerating the Mounjaro sugars have been good        Review of Systems  Constitutional: no chills, no fever and no night sweats.   Eyes: no blurred vision and no eyesight problems.   ENT: no hearing loss, no nasal congestion, no nasal discharge, no hoarseness and no sore throat.   Cardiovascular: no chest pain, no intermittent leg claudication, no lower extremity edema, no palpitations and no syncope.   Respiratory: no cough, no shortness of breath during exertion, no shortness of breath at rest and no wheezing.   Gastrointestinal: no abdominal pain, no blood in stools, no constipation, no diarrhea, no melena, no nausea, no rectal pain and no vomiting.   Genitourinary: no dysuria, no change in urinary frequency, no urinary hesitancy, no feelings of urinary urgency and no vaginal discharge.   Musculoskeletal: no arthralgias,  no back pain and no myalgias.   Integumentary: no new skin lesions and no rashes.   Neurological: no difficulty walking, no headache, no limb weakness, no numbness and no tingling.   Psychiatric: no anxiety, no depression, no anhedonia and no substance use disorders.   Endocrine: no recent weight gain and no recent weight loss.   Hematologic/Lymphatic: no tendency for easy bruising and no swollen glands .    Objective    /76   Pulse 65   Temp 36.4 °C (97.5 °F)   Wt 126 kg (278 lb 9.6 oz)   LMP  (LMP Unknown)   SpO2 96%   BMI 39.97 kg/m²    Physical Exam  The patient appeared well nourished and normally developed. Vital signs as documented. Head exam is unremarkable. No scleral icterus or corneal arcus noted.  Pupils are equal round reactive to light extraocular movements are intact no hemorrhages noted on funduscopic  exam mouth mucous membranes are moist no exudates ears canals clear TMs are gray pearly not injected nose no rhinorrhea or epistaxis Neck is without jugular venous distension, thyromegaly, or carotid bruits. Carotid upstrokes are brisk bilaterally. Lungs are clear to auscultation and percussion. Cardiac exam reveals the PMI to be normally sized and situated. Rhythm is regular. First and second heart sounds normal. No murmurs, rubs or gallops. Abdominal exam reveals normal bowel sounds, no masses, no organomegaly and no aortic enlargement. Extremities are nonedematous and both femoral and pedal pulses are normal.  Neurologic exam DTRs are equal bilaterally no focal deficits strength is symmetrical heme lymph no palpable lymph nodes in the neck axilla or groin    Assessment/Plan   Problem List Items Addressed This Visit       Abnormal vaginal bleeding - Primary    Relevant Orders    US PELVIS TRANSABDOMINAL WITH TRANSVAGINAL    Type 2 diabetes mellitus with hyperglycemia, without long-term current use of insulin   Diabetes is stable  Abnormal vaginal bleeding need to rule out malignancy check ultrasound         Sully Hill MD

## 2025-06-26 DIAGNOSIS — E11.9 TYPE 2 DIABETES MELLITUS WITHOUT COMPLICATION, WITHOUT LONG-TERM CURRENT USE OF INSULIN: ICD-10-CM

## 2025-06-26 RX ORDER — BLOOD-GLUCOSE SENSOR
EACH MISCELLANEOUS
Qty: 4 EACH | Refills: 8 | Status: SHIPPED | OUTPATIENT
Start: 2025-06-26

## 2025-08-18 DIAGNOSIS — E11.9 TYPE 2 DIABETES MELLITUS WITHOUT COMPLICATION, UNSPECIFIED WHETHER LONG TERM INSULIN USE: ICD-10-CM

## 2025-08-18 RX ORDER — METFORMIN HYDROCHLORIDE 1000 MG/1
1000 TABLET ORAL
Qty: 180 TABLET | Refills: 3 | Status: SHIPPED | OUTPATIENT
Start: 2025-08-18